# Patient Record
Sex: MALE | Race: WHITE | NOT HISPANIC OR LATINO | Employment: FULL TIME | URBAN - METROPOLITAN AREA
[De-identification: names, ages, dates, MRNs, and addresses within clinical notes are randomized per-mention and may not be internally consistent; named-entity substitution may affect disease eponyms.]

---

## 2017-03-16 ENCOUNTER — TRANSFERRED RECORDS (OUTPATIENT)
Dept: HEALTH INFORMATION MANAGEMENT | Facility: CLINIC | Age: 47
End: 2017-03-16

## 2017-04-13 ENCOUNTER — OFFICE VISIT (OUTPATIENT)
Dept: OTOLARYNGOLOGY | Facility: CLINIC | Age: 47
End: 2017-04-13
Payer: COMMERCIAL

## 2017-04-13 DIAGNOSIS — R22.0 LOCALIZED SWELLING, MASS, AND LUMP OF HEAD: Primary | ICD-10-CM

## 2017-04-13 PROCEDURE — 99204 OFFICE O/P NEW MOD 45 MIN: CPT | Performed by: OTOLARYNGOLOGY

## 2017-04-13 RX ORDER — MULTIVITAMIN,THERAPEUTIC
1 TABLET ORAL DAILY
COMMUNITY

## 2017-04-13 ASSESSMENT — PAIN SCALES - GENERAL: PAINLEVEL: NO PAIN (0)

## 2017-04-13 NOTE — PROGRESS NOTES
[facility]  [address] [place] [id number]    Dear Doctor Feliciano,    Thank you for asking me to see your patient, . Flaco Jimenez, in consultation to evaluate his right cheek lesion.  Today I had the pleasure of seeing him at the Facial Plastic and Reconstructive Surgery Clinic in the Department of Otolaryngology at [facility].    CLINICAL SUMMARY:   Diagnoses:  1) Right facial mass at the anterior edge of the masseter muscle deep to the skin and adherant to the underlying fascia. Possible parotid mass.    Comorbidities: None  Pertinent medications: None  Photographs: [facility] consents signed April 13, 2017.   Other: Enjoys being outdoors and being active.    Care Checklist:   _MRI face with IV contrast.      MEDICAL DECISION MAKING:   This most likely is a parotid mass and recommend an MRI to better understand its exact location and better define its likely diagnosis. However, this could be a bony mass off of the zygoma but I think it has some mobility separate from the zygoma. Because of this, I am favoring obtaining a MRI over a CT. We will obtain that scan and adjust our plan based on the results.        It has been a pleasure to participate in the care of Mr. Jimenez.  Thank you for this kind referral.      Sincerely,    Chay Katz MD    Division of Facial Plastic and Reconstructive Surgery,   Department of Otol[facility]  [facility]   ______________________________________________________________________                HISTORY OF PRESENT ILLNESS and SKIN LESION QUESTIONAAIRE:  As you know, Mr. Jimenez is an 47 year old-year-old male who presents with a skin lesion located on the right  cheek.   He first noticed this lesion 1 year ago.    Previous biopsy of this lesion: none.     His mom noticed about a year ago. No there symptoms.     Bleeding: none.   Provider- Bleeding: none.     Pain:  none.  Provider- Pain: none.    Color change: none.   Provider- Color change: none.     Growth: none.   Provider- Growth: none.     Ulceration: none.   Itching: none.  Purulence: none.   Oozing: none.   Edema: none.   Erythema: none.   History of rupture: none.   Recurrent physical trauma: none.         SKIN QUESTIONNAIRE:   Have you had a lot of sun exposure in the past? Yes    Do you remember blistering sunburns anywhere on your body? Yes  Do you currently smoke?No  Provider- Do you currently smoke?No    Have you smoked in the past?Yes  Have you had previous skin cancers? No  Provider- Have you had previous skin cancers? No    Family history of skin cancer?No      REVIEW OF SYSTEMS: a 12 system review was performed by the patient care staff:    Do you currently have or have you ever had in the past:    No. Complications with sedation or anesthesia.  No. Use blood thinners. Omega 3's                                  No. Any heart problems.   No. Chest pain.   No. A pacemaker.  No. Problems with excessive bleeding or a bleeding disorder.  No. Problems with blood clots or a clotting disorder.   No. Sleep apnea or sleep with a CPAP machine.       No. Excessive scarring.   No. Night sweats.   No. Fevers.   Yes. Double vision.   No. Vision loss.   Yes. Snoring.   No. Difficulty breathing through your nose.   No. Runny nose.   No. Sneezing.   No. Itchy eyes.   No. Itchy throat.   No. Face pain.   No. Face weakness.   No. Face numbness.   No. Difficulty swallowing.   No. Pain with swallowing.,   No. Difficulty hearing or hearing loss.   No. Difficulty urinating.   No. Anxiety.   No. Depression.     FAMILY HISTORY:  No. Family history of excessive bleeding or a bleeding disorder.   No. Family history of blood clots or a clotting disorder.     No. Family history of skin cancer.    No family history on file.    PAST MEDICAL HISTORY: History reviewed. No pertinent past medical history.    PAST SURGICAL HISTORY:   Past  Surgical History:   Procedure Laterality Date     CRANIOTOMY, EVACUATE HEMATOMA SUBDURAL, COMBINED       HERNIA REPAIR         SOCIAL HISTORY:   Social History   Substance Use Topics     Smoking status: Former Smoker     Smokeless tobacco: Not on file     Alcohol use Not on file       ALLERGIES: Clindamycin    MEDICATIONS:   Current Outpatient Prescriptions   Medication Sig Dispense Refill     multivitamin, therapeutic (THERA-VIT) TABS tablet Take 1 tablet by mouth daily       Omega-3 Fatty Acids (OMEGA 3 PO)        Saw Palmetto, Serenoa repens, (SAW PALMETTO EXTRACT PO)        Cholecalciferol (D3 ADULT PO)        BEE POLLEN PO        Yohimbe Bark (YOHIMBE PO)        UNABLE TO FIND MEDICATION NAME: Herbalife Total Control           Patient Care Staff Signature: Emily Pablo RN  ______________________________________________    Provider- Review of systems, FH, PMH, PSH, SH, ALL, and Medications taken by the patient care staff was reviewed by me: Chay Katz      Provider- PHYSICAL EXAMINATION:  CONSTITUTIONAL:  No apparent distress.  Pleasant affect.  Normal ability to communicate.  CRANIOFACIAL:  Normocephalic, atraumatic.    SKIN: overlying the area of concern of the right cheek is entirely normal. Under this area is a 1.5x1.5 cm mass, non-tender, with indistinct borders. Overlying skin is mobile, but the mass has minimal movement within the underlying fascia, but I did feel some movement potentially indicating that it is not a bony growth but I cannot be sure. This is located at the anterior aspect of the masseter muscle when he bites down overlying the inferior edge of the zygoma.     EYES:  Extraocular muscles intact.  EARS:  Normal auricles.   NOSE: No external nasal valve collapse.  Slight septal deviation.  No polyps or purulence.  ORAL CAVITY AND OROPHARYNX: no lesions on inspection.  NECK:  Supple laryngeal landmarks.  LYMPHATIC:  No palpable lymphadenopathy.  CARDIOVASCULAR:  Carotid pulses are  palpable bilaterally.  NEUROLOGIC:  Facial nerve intact.  RESPIRATORY:  Normal respiratory effort.  No stridor.  Voice strong.

## 2017-04-13 NOTE — MR AVS SNAPSHOT
After Visit Summary   4/13/2017    Flaco Jimenez    MRN: [id number]           Patient Information     Date Of Birth          1970        Visit Information        Provider Department      4/13/2017 8:00 AM Chay Katz MD [facility]        Today's Diagnoses     Localized swelling, mass, and lump of head    -  1      Care Instructions    Please contact our clinic if you have questions or if problems arise:    [place] office: [phone]  [facility] office: [phone]    If it is an urgent matter when the clinic is closed, please contact the [facility]  [phone] and ask to have Dr. Chay Katz, or the ENT resident on-call paged. If it is a serious matter requiring immediate attention, please call 911 or go to your nearest emergency department.          Follow-ups after your visit        Your next 10 appointments already scheduled     Apr 18, 2017 11:45 AM CDT   MR NECK FACE W/O & W CONTRAST with MGMR1   [facility] ([facility])    [address] [place] [id number] [phone]           Take your medicines as usual, unless your doctor tells you not to. Bring a list of your current medicines to your exam (including vitamins, minerals and over-the-counter drugs).  You will be given intravenous contrast for this exam. To prepare:   The day before your exam, drink extra fluids at least six 8-ounce glasses (unless your doctor tells you to restrict your fluids).   Have a blood test (creatinine test) within 30 days of your exam. Go to your clinic or Diagnostic Imaging Department for this test.  The MRI machine uses a strong magnet. Please wear clothes without metal (snaps, zippers). A sweatsuit works well, or we may give you a hospital gown.  Please remove any body piercings and hair extensions before you arrive. You will also remove watches, jewelry, hairpins, wallets, dentures, partial  dental plates and hearing aids. You may wear contact lenses, and you may be able to wear your rings. We have a safe place to keep your personal items, but it is safer to leave them at home.   **IMPORTANT** THE INSTRUCTIONS BELOW ARE ONLY FOR THOSE PATIENTS WHO HAVE BEEN TOLD THEY WILL RECEIVE SEDATION OR GENERAL ANESTHESIA DURING THEIR MRI PROCEDURE:  IF YOU WILL RECEIVE SEDATION (take medicine to help you relax during your exam):   You must get the medicine from your doctor before you arrive. Bring the medicine to the exam. Do not take it at home.   Arrive one hour early. Bring someone who can take you home after the test. Your medicine will make you sleepy. After the exam, you may not drive, take a bus or take a taxi by yourself.   No eating 8 hours before your exam. You may have clear liquids up until 4 hours before your exam. (Clear liquids include water, clear tea, black coffee and fruit juice without pulp.)  IF YOU WILL RECEIVE ANESTHESIA (be asleep for your exam):   Arrive 1 1/2 hours early. Bring someone who can take you home after the test. You may not drive, take a bus or take a taxi by yourself.   No eating 8 hours before your exam. You may have clear liquids up until 4 hours before your exam. (Clear liquids include water, clear tea, black coffee and fruit juice without pulp.)  Please call the Imaging Department at your exam site with any questions.            May 11, 2017 10:30 AM CDT   Return Visit with Chay Katz MD   [facility] ([facility])    [address] [place] [address] [phone]              Future tests that were ordered for you today     Open Future Orders        Priority Expected Expires Ordered    MR Neck Face w/o & w Contrast Routine  4/13/2018 4/13/2017            Who to contact     If you have questions or need follow up information about today's clinic visit or your schedule please contact [facility] directly  at [phone].  Normal or non-critical lab and imaging results will be communicated to you by KupiVIPhart, letter or phone within 4 business days after the clinic has received the results. If you do not hear from us within 7 days, please contact the clinic through KupiVIPhart or phone. If you have a critical or abnormal lab result, we will notify you by phone as soon as possible.  Submit refill requests through [employer] or call your pharmacy and they will forward the refill request to us. Please allow 3 business days for your refill to be completed.          Additional Information About Your Visit        Zakazaka Information     Z[employer]aka is an electronic gateway that provides easy, online access to your medical records. With Zakazaka, you can request a clinic appointment, read your test results, renew a prescription or communicate with your care team.     To sign up for Zakazaka visit the website at [email or web address]   You will be asked to enter the access code listed below, as well as some personal information. Please follow the directions to create your username and password.     Your access code is: [email or web address]  Expires: 2017  8:47 AM     Your access code will  in 90 days. If you need help or a new code, please contact your [facility] or call [phone] for assistance.        Care EveryWhere ID     This is your Care EveryWhere ID. This could be used by other organizations to access your [place] medical records  [id number]         Blood Pressure from Last 3 Encounters:   No data found for BP    Weight from Last 3 Encounters:   No data found for Wt               Primary Care Provider    None Specified       No primary provider on file.        Thank you!     Thank you for choosing [facility]  for your care. Our goal is always to provide you with excellent care. Hearing back from our patients is one way we can continue to improve our services. Please  take a few minutes to complete the written survey that you may receive in the mail after your visit with us. Thank you!             Your Updated Medication List - Protect others around you: Learn how to safely use, store and throw away your medicines at www.disposemymeds.org.          This list is accurate as of: 4/13/17  9:03 AM.  Always use your most recent med list.                   Brand Name Dispense Instructions for use    BEE POLLEN PO          D3 ADULT PO          multivitamin, therapeutic Tabs tablet      Take 1 tablet by mouth daily       OMEGA 3 PO          SAW PALMETTO EXTRACT PO          UNABLE TO FIND      MEDICATION NAME: Herbalife Total Control       YOHIMBE PO

## 2017-04-13 NOTE — PATIENT INSTRUCTIONS
Please contact our clinic if you have questions or if problems arise:    [place] office: [phone]  [facility] office: [phone]    If it is an urgent matter when the clinic is closed, please contact the [facility]  [phone] and ask to have Dr. Chay Katz, or the ENT resident on-call paged. If it is a serious matter requiring immediate attention, please call 911 or go to your nearest emergency department.

## 2017-04-13 NOTE — NURSING NOTE
Flaco Jimenez's goals for this visit include:   Chief Complaint   Patient presents with     Lesion     right cheek lesion       He requests these members of his care team be copied on today's visit information: No primary care provider on file.      PCP: No primary care provider on file.    Referring Provider:  Ye Wiggins MD  [facility]  [address] [place] [address]    Chief Complaint   Patient presents with     Lesion     right cheek lesion       Initial There were no vitals taken for this visit. There is no height or weight on file to calculate BMI.  Medication Reconciliation: complete    Do you need any medication refills at today's visit? No    Emily Pablo RN

## 2017-05-08 ENCOUNTER — RADIANT APPOINTMENT (OUTPATIENT)
Dept: MRI IMAGING | Facility: CLINIC | Age: 47
End: 2017-05-08
Attending: OTOLARYNGOLOGY
Payer: COMMERCIAL

## 2017-05-08 DIAGNOSIS — R22.0 LOCALIZED SWELLING, MASS, AND LUMP OF HEAD: ICD-10-CM

## 2017-05-08 PROCEDURE — 70543 MRI ORBT/FAC/NCK W/O &W/DYE: CPT | Performed by: RADIOLOGY

## 2017-05-08 PROCEDURE — A9585 GADOBUTROL INJECTION: HCPCS | Mod: JW | Performed by: RADIOLOGY

## 2017-05-08 RX ORDER — GADOBUTROL 604.72 MG/ML
10 INJECTION INTRAVENOUS ONCE
Status: COMPLETED | OUTPATIENT
Start: 2017-05-08 | End: 2017-05-08

## 2017-05-08 RX ADMIN — GADOBUTROL 9 ML: 604.72 INJECTION INTRAVENOUS at 08:47

## 2017-05-11 ENCOUNTER — OFFICE VISIT (OUTPATIENT)
Dept: OTOLARYNGOLOGY | Facility: CLINIC | Age: 47
End: 2017-05-11
Payer: COMMERCIAL

## 2017-05-11 DIAGNOSIS — R22.0 LOCALIZED SWELLING, MASS, AND LUMP OF HEAD: Primary | ICD-10-CM

## 2017-05-11 PROCEDURE — 99213 OFFICE O/P EST LOW 20 MIN: CPT | Performed by: OTOLARYNGOLOGY

## 2017-05-11 ASSESSMENT — PAIN SCALES - GENERAL: PAINLEVEL: NO PAIN (0)

## 2017-05-11 NOTE — PROGRESS NOTES
Today I had the pleasure of seeing Mr. Flaco Jimenez at the Facial Plastic and Reconstructive Surgery Clinic in the Department of Otolaryngology at the [facility].  He follows up after undergoing an MRI.  He reports no change in his condition.  He thinks the mass may have grown a small amount since I saw him last.  Most importantly, he reports no neurologic changes.  No bodily weakness or numbness.  No change in his function.  No sensation changes.  He feels in otherwise good health.      PHYSICAL EXAMINATION:  He is in no apparent distress.  Pleasant affect.  Normal ability to communicate.  Normal respiratory effort.  No stridor.  Voice strong.  He continues to have an approximately 1.5 x 1.5 cm mass of his right cheek.  This is clearly separate from his overlying skin because the skin is mobile.  On palpation it is stuck down to the deep fascia.  Although I can get to move bleed and it has some movement that separate from the underlying bone and deep musculature.  It is nontender.  He has a normal cranial nerve exam and his facial nerve function is intact and symmetric bilaterally.      Mr. Jimenez and I reviewed the MRI images together showing the right parotid mass.  We also reviewed the official reading.        ASSESSMENT AND RECOMMENDATIONS:  We discussed how this mass of his right parotid gland was read as a possible malignancy.  I recommend seeing one of my colleagues in the Head and Neck Oncology at the [facility] for further evaluation and treatment.  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  I recommend he discuss this with his primary care provider.  He reported that he did not have a primary care provider so our office staff helped make those arrangements for him to establish care with primary care and discuss this finding.  I think it is certainly safe to handle  this finding as an outpatient because it was read as a chronic infarct and he reports absolutely no neurologic symptoms at this time.      It has been a pleasure to participate in the care of Mr. Jimenez.  I encouraged him to contact us if he has questions or if I can be of service.         YANNA JESUS MD             D: 2017 10:54   T: 2017 17:16   MT:       Name:     RICHY JIMENEZ   MRN:      [id number]        Account:      [id number]   :      1970           Visit Date:   2017      Document: [id number]

## 2017-05-11 NOTE — PROGRESS NOTES
CLINICAL SUMMARY:   Diagnoses:  1) Right parotid mass at the anterior edge of the masseter muscle deep to the skin and adherant to the underlying fascia.   -5/8/17: MRI Neck and Face: 1. Enhancing mass centered within accessory right parotid tissue overlying the right masseter muscle, suspicious for primary salivary gland malignancy. No visualized lymphadenopathy. 2. 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       Comorbidities: None  Pertinent medications: None  Photographs: [facility] consents signed April 13, 2017.   Other: Enjoys being outdoors and being active.    Care Checklist:   _Consultation with head and neck oncology at [facility] for possible parotid malignancy.  _Discuss with PCP the cerebral peduncle infarct.          This office note has been dictated.  Chay Katz

## 2017-05-11 NOTE — PATIENT INSTRUCTIONS
Please contact our clinic if you have questions or if problems arise:    [place] office: [phone]  [facility] office: [phone]    If it is an urgent matter when the clinic is closed, please contact the [facility]  [phone] and ask to have Dr. Chay Katz, or the ENT resident on-call paged. If it is a serious matter requiring immediate attention, please call 911 or go to your nearest emergency department.

## 2017-05-11 NOTE — MR AVS SNAPSHOT
After Visit Summary   5/11/2017    Flaco Jimenez    MRN: [id number]           Patient Information     Date Of Birth          1970        Visit Information        Provider Department      5/11/2017 10:30 AM Chay Katz MD [facility]        Today's Diagnoses     Localized swelling, mass, and lump of head    -  1       Follow-ups after your visit        Your next 10 appointments already scheduled     May 18, 2017  9:20 AM CDT   New Visit with JIN Singh CNP   [facility] ([facility])    [address] [place] [address] [phone]            May 19, 2017  1:20 PM CDT   (Arrive by 1:05 PM)   NEW TUMOR VISIT with Amie Warren MD   [facility] ([facility])    [address] [address] [phone]              Who to contact     If you have questions or need follow up information about today's clinic visit or your schedule please contact [facility] directly at [phone].  Normal or non-critical lab and imaging results will be communicated to you by MyChart, letter or phone within 4 business days after the clinic has received the results. If you do not hear from us within 7 days, please contact the clinic through MyChart or phone. If you have a critical or abnormal lab result, we will notify you by phone as soon as possible.  Submit refill requests through [employer] or call your pharmacy and they will forward the refill request to us. Please allow 3 business days for your refill to be completed.          Additional Information About Your Visit        MyChart Information     [employer] is an electronic gateway that provides easy, online access to your medical records. With [employer], you can request a clinic appointment, read your test results, renew a prescription or communicate with your care team.     To sign up for  [employer] visit the website at [email or web address]   You will be asked to enter the access code listed below, as well as some personal information. Please follow the directions to create your username and password.     Your access code is: [email or web address]  Expires: 2017  8:47 AM     Your access code will  in 90 days. If you need help or a new code, please contact your [facility] or call [phone] for assistance.        Care EveryWhere ID     This is your Care EveryWhere ID. This could be used by other organizations to access your [place] medical records  [id number]         Blood Pressure from Last 3 Encounters:   No data found for BP    Weight from Last 3 Encounters:   No data found for Wt              Today, you had the following     No orders found for display       Primary Care Provider    None Specified       No primary provider on file.        Thank you!     Thank you for choosing [facility]  for your care. Our goal is always to provide you with excellent care. Hearing back from our patients is one way we can continue to improve our services. Please take a few minutes to complete the written survey that you may receive in the mail after your visit with us. Thank you!             Your Updated Medication List - Protect others around you: Learn how to safely use, store and throw away your medicines at www.disposemymeds.org.          This list is accurate as of: 17 10:50 AM.  Always use your most recent med list.                   Brand Name Dispense Instructions for use    AMINO ACIDS PO      Take by mouth three times a week       BEE POLLEN PO          D3 ADULT PO          multivitamin, therapeutic Tabs tablet      Take 1 tablet by mouth daily       OMEGA 3 PO          SAW PALMETTO EXTRACT PO          UNABLE TO FIND      MEDICATION NAME: Herbalife Total Control       YOHIMBE PO      Reported on 2017

## 2017-05-12 ENCOUNTER — PRE VISIT (OUTPATIENT)
Dept: OTOLARYNGOLOGY | Facility: CLINIC | Age: 47
End: 2017-05-12

## 2017-05-12 NOTE — TELEPHONE ENCOUNTER
1.  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  Referring provider: Chay Katz  3.  Call to patient (Yes / No - short description): no, referred  4.  Previous care at / records requested from: [facility] -- records and imaging in epic/pacs  5.  Other: [facility] -- one clinic note 3/16/17 -- records in [facility].

## 2017-05-19 ENCOUNTER — OFFICE VISIT (OUTPATIENT)
Dept: OTOLARYNGOLOGY | Facility: CLINIC | Age: 47
End: 2017-05-19

## 2017-05-19 VITALS
OXYGEN SATURATION: 95 % | HEART RATE: 52 BPM | SYSTOLIC BLOOD PRESSURE: 132 MMHG | WEIGHT: 195 LBS | DIASTOLIC BLOOD PRESSURE: 86 MMHG

## 2017-05-19 DIAGNOSIS — R22.0 CHEEK MASS: Primary | ICD-10-CM

## 2017-05-19 DIAGNOSIS — D11.0 PLEOMORPHIC ADENOMA OF PAROTID GLAND: ICD-10-CM

## 2017-05-19 RX ORDER — ALBUTEROL SULFATE 90 UG/1
AEROSOL, METERED RESPIRATORY (INHALATION)
COMMUNITY
Start: 2016-10-30

## 2017-05-19 ASSESSMENT — PAIN SCALES - GENERAL: PAINLEVEL: NO PAIN (0)

## 2017-05-19 NOTE — PATIENT INSTRUCTIONS
FNA done today  Consistent with a pleomorphic adenoma  Call me if You are interested in surgery  [phone]  Trish Brink RN

## 2017-05-19 NOTE — NURSING NOTE
Chief Complaint   Patient presents with     Consult     New Tumor Dx: Salivary gland malignancy Dr. Katz from  ENT referring to Dr. Rodrigez or 1st available MRI 5/8/2017     Pt states no pain today.    TALI Benitez LPN

## 2017-05-19 NOTE — MR AVS SNAPSHOT
After Visit Summary   5/19/2017    Boris Jimenez    MRN: [id number]           Patient Information     Date Of Birth          1970        Visit Information        Provider Department      5/19/2017 1:20 PM Amie Warren MD [facility]        Today's Diagnoses     Cheek mass    -  1    Pleomorphic adenoma of parotid gland          Care Instructions    FNA done today  Consistent with a pleomorphic adenoma  Call me if You are interested in surgery  [phone]  Trish Brink RN        Follow-ups after your visit        Who to contact     Please call your clinic at [phone] to:    Ask questions about your health    Make or cancel appointments    Discuss your medicines    Learn about your test results    Speak to your doctor   If you have compliments or concerns about an experience at your clinic, or if you wish to file a complaint, please contact [facility] Patient Relations at [phone] or email us at [email or web address]         Additional Information About Your Visit        MyChart Information     [employer] gives you secure access to your electronic health record. If you see a primary care provider, you can also send messages to your care team and make appointments. If you have questions, please call your primary care clinic.  If you do not have a primary care provider, please call [phone] and they will assist you.      [employer] is an electronic gateway that provides easy, online access to your medical records. With [employer], you can request a clinic appointment, read your test results, renew a prescription or communicate with your care team.     To access your existing account, please contact your [facility] Clinic or call [phone] for assistance.        Care EveryWhere ID     This is your Care EveryWhere ID. This could be used by other organizations to access your [facility] medical records  [id number]         Your Vitals Were     Pulse Pulse Oximetry                52 95%           Blood Pressure from Last 3 Encounters:   05/19/17 132/86    Weight from Last 3 Encounters:   05/19/17 88.5 kg (195 lb)              We Performed the Following     Fine needle aspiration        Primary Care Provider    [facility]       No address on file        Thank you!     Thank you for choosing [facility]  for your care. Our goal is always to provide you with excellent care. Hearing back from our patients is one way we can continue to improve our services. Please take a few minutes to complete the written survey that you may receive in the mail after your visit with us. Thank you!             Your Updated Medication List - Protect others around you: Learn how to safely use, store and throw away your medicines at www.disposemymeds.org.          This list is accurate as of: 5/19/17 11:59 PM.  Always use your most recent med list.                   Brand Name Dispense Instructions for use    AMINO ACIDS PO      Take by mouth three times a week       BEE POLLEN PO          D3 ADULT PO          multivitamin, therapeutic Tabs tablet      Take 1 tablet by mouth daily       OMEGA 3 PO          SAW PALMETTO EXTRACT PO          UNABLE TO FIND      MEDICATION NAME: Herbalife Total Control       VENTOLIN  (90 BASE) MCG/ACT Inhaler   Generic drug:  albuterol          YOHIMBE PO      Reported on 5/11/2017
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
## 2017-05-20 PROBLEM — D11.0 PLEOMORPHIC ADENOMA OF PAROTID GLAND: Status: ACTIVE | Noted: 2017-05-20
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
## 2017-05-24 LAB — COPATH REPORT: NORMAL

## 2017-05-31 ENCOUNTER — CARE COORDINATION (OUTPATIENT)
Dept: OTOLARYNGOLOGY | Facility: CLINIC | Age: 47
End: 2017-05-31

## 2017-05-31 NOTE — PROGRESS NOTES
Dr. Warren reviewed Flaco's pathology  Flaco was called and a message left on his voicemail with the results  He is to call me when and if he is interested in surgery

## 2017-06-01 DIAGNOSIS — K11.8 PAROTID MASS: Primary | ICD-10-CM

## 2017-06-05 ENCOUNTER — HOSPITAL ENCOUNTER (OUTPATIENT)
Facility: CLINIC | Age: 47
End: 2017-06-05
Attending: OTOLARYNGOLOGY | Admitting: OTOLARYNGOLOGY
Payer: COMMERCIAL

## 2017-06-07 RX ORDER — CEFAZOLIN SODIUM 2 G/100ML
2 INJECTION, SOLUTION INTRAVENOUS
Status: CANCELLED | OUTPATIENT
Start: 2017-06-07

## 2017-06-08 ENCOUNTER — TRANSFERRED RECORDS (OUTPATIENT)
Dept: HEALTH INFORMATION MANAGEMENT | Facility: CLINIC | Age: 47
End: 2017-06-08

## 2017-06-14 ENCOUNTER — ANESTHESIA EVENT (OUTPATIENT)
Dept: SURGERY | Facility: AMBULATORY SURGERY CENTER | Age: 47
End: 2017-06-14

## 2017-06-15 ENCOUNTER — HOSPITAL ENCOUNTER (OUTPATIENT)
Facility: AMBULATORY SURGERY CENTER | Age: 47
End: 2017-06-15
Attending: OTOLARYNGOLOGY

## 2017-06-15 ENCOUNTER — ANESTHESIA (OUTPATIENT)
Dept: SURGERY | Facility: AMBULATORY SURGERY CENTER | Age: 47
End: 2017-06-15

## 2017-06-15 ENCOUNTER — SURGERY (OUTPATIENT)
Age: 47
End: 2017-06-15

## 2017-06-15 VITALS
TEMPERATURE: 96.8 F | DIASTOLIC BLOOD PRESSURE: 72 MMHG | OXYGEN SATURATION: 94 % | HEIGHT: 69 IN | SYSTOLIC BLOOD PRESSURE: 129 MMHG | RESPIRATION RATE: 14 BRPM | BODY MASS INDEX: 28.88 KG/M2 | HEART RATE: 52 BPM | WEIGHT: 195 LBS

## 2017-06-15 DIAGNOSIS — K11.8 PAROTID MASS: Primary | ICD-10-CM

## 2017-06-15 RX ORDER — FENTANYL CITRATE 50 UG/ML
25-50 INJECTION, SOLUTION INTRAMUSCULAR; INTRAVENOUS
Status: DISCONTINUED | OUTPATIENT
Start: 2017-06-15 | End: 2017-06-15 | Stop reason: HOSPADM

## 2017-06-15 RX ORDER — OXYCODONE HYDROCHLORIDE 5 MG/1
5-10 TABLET ORAL
Qty: 30 TABLET | Refills: 0 | Status: SHIPPED | OUTPATIENT
Start: 2017-06-15 | End: 2023-09-13

## 2017-06-15 RX ORDER — SODIUM CHLORIDE, SODIUM LACTATE, POTASSIUM CHLORIDE, CALCIUM CHLORIDE 600; 310; 30; 20 MG/100ML; MG/100ML; MG/100ML; MG/100ML
INJECTION, SOLUTION INTRAVENOUS CONTINUOUS
Status: DISCONTINUED | OUTPATIENT
Start: 2017-06-15 | End: 2017-06-15 | Stop reason: HOSPADM

## 2017-06-15 RX ORDER — GINSENG 100 MG
CAPSULE ORAL PRN
Status: DISCONTINUED | OUTPATIENT
Start: 2017-06-15 | End: 2017-06-15 | Stop reason: HOSPADM

## 2017-06-15 RX ORDER — LIDOCAINE HYDROCHLORIDE 20 MG/ML
INJECTION, SOLUTION INFILTRATION; PERINEURAL PRN
Status: DISCONTINUED | OUTPATIENT
Start: 2017-06-15 | End: 2017-06-15

## 2017-06-15 RX ORDER — FENTANYL CITRATE 50 UG/ML
INJECTION, SOLUTION INTRAMUSCULAR; INTRAVENOUS PRN
Status: DISCONTINUED | OUTPATIENT
Start: 2017-06-15 | End: 2017-06-15

## 2017-06-15 RX ORDER — ONDANSETRON 4 MG/1
4 TABLET, ORALLY DISINTEGRATING ORAL EVERY 30 MIN PRN
Status: DISCONTINUED | OUTPATIENT
Start: 2017-06-15 | End: 2017-06-16 | Stop reason: HOSPADM

## 2017-06-15 RX ORDER — NALOXONE HYDROCHLORIDE 0.4 MG/ML
.1-.4 INJECTION, SOLUTION INTRAMUSCULAR; INTRAVENOUS; SUBCUTANEOUS
Status: DISCONTINUED | OUTPATIENT
Start: 2017-06-15 | End: 2017-06-16 | Stop reason: HOSPADM

## 2017-06-15 RX ORDER — ONDANSETRON 4 MG/1
4 TABLET, ORALLY DISINTEGRATING ORAL
Status: DISCONTINUED | OUTPATIENT
Start: 2017-06-15 | End: 2017-06-16 | Stop reason: HOSPADM

## 2017-06-15 RX ORDER — SODIUM CHLORIDE, SODIUM LACTATE, POTASSIUM CHLORIDE, CALCIUM CHLORIDE 600; 310; 30; 20 MG/100ML; MG/100ML; MG/100ML; MG/100ML
INJECTION, SOLUTION INTRAVENOUS CONTINUOUS
Status: DISCONTINUED | OUTPATIENT
Start: 2017-06-15 | End: 2017-06-16 | Stop reason: HOSPADM

## 2017-06-15 RX ORDER — GABAPENTIN 300 MG/1
300 CAPSULE ORAL ONCE
Status: COMPLETED | OUTPATIENT
Start: 2017-06-15 | End: 2017-06-15

## 2017-06-15 RX ORDER — ONDANSETRON 2 MG/ML
4 INJECTION INTRAMUSCULAR; INTRAVENOUS EVERY 30 MIN PRN
Status: DISCONTINUED | OUTPATIENT
Start: 2017-06-15 | End: 2017-06-16 | Stop reason: HOSPADM

## 2017-06-15 RX ORDER — FENTANYL CITRATE 50 UG/ML
25-50 INJECTION, SOLUTION INTRAMUSCULAR; INTRAVENOUS EVERY 5 MIN PRN
Status: DISCONTINUED | OUTPATIENT
Start: 2017-06-15 | End: 2017-06-15 | Stop reason: HOSPADM

## 2017-06-15 RX ORDER — PROPOFOL 10 MG/ML
INJECTION, EMULSION INTRAVENOUS PRN
Status: DISCONTINUED | OUTPATIENT
Start: 2017-06-15 | End: 2017-06-15

## 2017-06-15 RX ORDER — LIDOCAINE 40 MG/G
CREAM TOPICAL
Status: DISCONTINUED | OUTPATIENT
Start: 2017-06-15 | End: 2017-06-15 | Stop reason: HOSPADM

## 2017-06-15 RX ORDER — PROPOFOL 10 MG/ML
INJECTION, EMULSION INTRAVENOUS CONTINUOUS PRN
Status: DISCONTINUED | OUTPATIENT
Start: 2017-06-15 | End: 2017-06-15

## 2017-06-15 RX ORDER — EPHEDRINE SULFATE 50 MG/ML
INJECTION, SOLUTION INTRAMUSCULAR; INTRAVENOUS; SUBCUTANEOUS PRN
Status: DISCONTINUED | OUTPATIENT
Start: 2017-06-15 | End: 2017-06-15

## 2017-06-15 RX ORDER — ACETAMINOPHEN 325 MG/1
975 TABLET ORAL ONCE
Status: COMPLETED | OUTPATIENT
Start: 2017-06-15 | End: 2017-06-15

## 2017-06-15 RX ORDER — ACETAMINOPHEN 325 MG/1
650 TABLET ORAL
Status: DISCONTINUED | OUTPATIENT
Start: 2017-06-15 | End: 2017-06-16 | Stop reason: HOSPADM

## 2017-06-15 RX ORDER — MEPERIDINE HYDROCHLORIDE 25 MG/ML
12.5 INJECTION INTRAMUSCULAR; INTRAVENOUS; SUBCUTANEOUS
Status: DISCONTINUED | OUTPATIENT
Start: 2017-06-15 | End: 2017-06-16 | Stop reason: HOSPADM

## 2017-06-15 RX ORDER — OXYCODONE HYDROCHLORIDE 5 MG/1
5-10 TABLET ORAL
Status: DISCONTINUED | OUTPATIENT
Start: 2017-06-15 | End: 2017-06-16 | Stop reason: HOSPADM

## 2017-06-15 RX ORDER — AMOXICILLIN 250 MG
1-2 CAPSULE ORAL 2 TIMES DAILY
Qty: 30 TABLET | Refills: 0 | Status: SHIPPED | OUTPATIENT
Start: 2017-06-15

## 2017-06-15 RX ORDER — ONDANSETRON 2 MG/ML
INJECTION INTRAMUSCULAR; INTRAVENOUS PRN
Status: DISCONTINUED | OUTPATIENT
Start: 2017-06-15 | End: 2017-06-15

## 2017-06-15 RX ADMIN — LIDOCAINE HYDROCHLORIDE 100 MG: 20 INJECTION, SOLUTION INFILTRATION; PERINEURAL at 07:23

## 2017-06-15 RX ADMIN — SODIUM CHLORIDE, SODIUM LACTATE, POTASSIUM CHLORIDE, CALCIUM CHLORIDE: 600; 310; 30; 20 INJECTION, SOLUTION INTRAVENOUS at 07:16

## 2017-06-15 RX ADMIN — ACETAMINOPHEN 975 MG: 325 TABLET ORAL at 06:31

## 2017-06-15 RX ADMIN — SODIUM CHLORIDE, SODIUM LACTATE, POTASSIUM CHLORIDE, CALCIUM CHLORIDE: 600; 310; 30; 20 INJECTION, SOLUTION INTRAVENOUS at 06:34

## 2017-06-15 RX ADMIN — FENTANYL CITRATE 50 MCG: 50 INJECTION, SOLUTION INTRAMUSCULAR; INTRAVENOUS at 07:23

## 2017-06-15 RX ADMIN — Medication 0.5 MG: at 10:05

## 2017-06-15 RX ADMIN — FENTANYL CITRATE 50 MCG: 50 INJECTION, SOLUTION INTRAMUSCULAR; INTRAVENOUS at 07:53

## 2017-06-15 RX ADMIN — PROPOFOL 230 MG: 10 INJECTION, EMULSION INTRAVENOUS at 07:23

## 2017-06-15 RX ADMIN — PROPOFOL 70 MG: 10 INJECTION, EMULSION INTRAVENOUS at 07:57

## 2017-06-15 RX ADMIN — Medication 0.5 MG: at 08:01

## 2017-06-15 RX ADMIN — Medication 1 TUBE: at 10:57

## 2017-06-15 RX ADMIN — PROPOFOL: 10 INJECTION, EMULSION INTRAVENOUS at 08:56

## 2017-06-15 RX ADMIN — GABAPENTIN 300 MG: 300 CAPSULE ORAL at 06:31

## 2017-06-15 RX ADMIN — EPHEDRINE SULFATE 5 MG: 50 INJECTION, SOLUTION INTRAMUSCULAR; INTRAVENOUS; SUBCUTANEOUS at 07:47

## 2017-06-15 RX ADMIN — SODIUM CHLORIDE, SODIUM LACTATE, POTASSIUM CHLORIDE, CALCIUM CHLORIDE: 600; 310; 30; 20 INJECTION, SOLUTION INTRAVENOUS at 10:45

## 2017-06-15 RX ADMIN — PROPOFOL 200 MCG/KG/MIN: 10 INJECTION, EMULSION INTRAVENOUS at 07:25

## 2017-06-15 RX ADMIN — ONDANSETRON 4 MG: 2 INJECTION INTRAMUSCULAR; INTRAVENOUS at 10:43

## 2017-06-15 NOTE — OR NURSING
Dr Warren's resident came to PACU to assess patient's cranial nerves. She is comfortable with patient's response, inability to smile on the right, stated that his nerves will be irritated over the next couple weeks as the surgery took place right next to the nerves.   Xochilt Joshua RN BSN CPN  6/15/2017 12:26 PM

## 2017-06-15 NOTE — BRIEF OP NOTE
[facility]    Brief Operative Note    Pre-operative diagnosis: Parotid Mass  Post-operative diagnosis * No post-op diagnosis entered *  Procedure: Procedure(s):  Right Parotidectomy with continuous facial nerve monitoring - Wound Class: I-Clean  Surgeon: Surgeon(s) and Role:     * Amie Warren MD - Primary  Anesthesia: General   Estimated blood loss: * No values recorded between 6/15/2017  7:57 AM and 6/15/2017 11:35 AM *  Drains: Chet-Engle  Specimens:   ID Type Source Tests Collected by Time Destination   A : Right Partial Parotidectomy Tissue Parotid Gland SURGICAL PATHOLOGY EXAM Amie Warren MD 6/15/2017 10:41 AM      Findings:   None.  Complications: None.  Implants: None.

## 2017-06-15 NOTE — OR NURSING
RN paged Dr Warren regarding Boris's cranial nerve assesments in PACU. Report from CRNA to Phase 1 was that patient's smile was not intact at the end of the case and that Dr Warren would be by later when the patient was not as sedated to reassess. Dr Warren stated to RN that patient did not need cranial nerve assessments. She stated she will send a resident over however as the patients smile is fully intact on the left side while the right side of the patients face does not move. Patients left shoulder raises higher in a shrug than the right shoulder. Patient continues to be monitored in Phase 1 recovery.  Xochilt Joshua RN BSN CPN  6/15/2017 12:15 PM

## 2017-06-15 NOTE — DISCHARGE INSTRUCTIONS
[facility]  Home Care Following Anesthesia  For 24 hours after surgery:  1. Get plenty of rest.  A responsible adult must stay with you for at least 24 hours after you leave the surgery center.  2. Do not drive or use heavy equipment.  If you have weakness or tingling, don't drive or use heavy equipment until this feeling goes away.   3. Do not drink alcohol.   4. Avoid strenuous or risky activities.  Ask for help when climbing stairs.  5. You may feel lightheaded.  IF so, sit for a few minutes before standing.  Have someone help you get up.   6. If you have nausea (feel sick to your stomach): Drink only clear liquids such as apple juice, ginger ale, broth or 7-Up.  Rest may also help.  Be sure to drink enough fluids.  Move to a regular diet as you feel able.   7. You may have a slight fever.  Call the doctor if your fever is over 100 F (37.7 C) (taken under the tongue) or lasts longer than 24 hours.  8. You may have a dry mouth, a sore throat, muscle aches or trouble sleeping. These should go away after 24 hours.  9. Do not make important or legal decisions.     tips for taking pain medications  To get the best pain relief possible, remember these points:    Take pain medications as directed, before pain becomes severe.    Pain medication can upset your stomach: taking it with food may help.    Constipation is a common side effect of pain medication. Drink plenty of  fluids.    Eat foods high in fiber. Take a stool softener if recommended by your doctor or pharmacist.    Do not drink alcohol, drive or operate machinery while taking pain medications.    Ask about other ways to control pain, such as with heat, ice or relaxation.    Call a doctor for any of the followin. Signs of infection (fever, growing tenderness at the surgery site, a large amount of drainage or bleeding, severe pain, foul-smelling drainage, redness, swelling).  2. It has been over 8 to 10 hours since  surgery and you are still not able to urinate (pass water).  3. Headache for over 24 hours.  4. Numbness, tingling or weakness the day after surgery (if you had spinal anesthesia).  Your doctor is: Dr. Warren   [phone]  ***               Or dial [phone] and ask for the resident on call for:  ENT Otolaryngology  For emergency care, call the:  [facility]:  [phone] (TTY for hearing impaired: [phone])

## 2017-06-15 NOTE — IP AVS SNAPSHOT
MRN:[id number]                      After Visit Summary   6/15/2017    Boris Jimenez    MRN: [id number]           Thank you!     Thank you for choosing [facility] for your care. Our goal is always to provide you with excellent care. Hearing back from our patients is one way we can continue to improve our services. Please take a few minutes to complete the written survey that you may receive in the mail after you visit with us. Thank you!        Patient Information     Date Of Birth          1970        About your hospital stay     You were admitted on:  Brenda 15, 2017 [facility]    You were discharged on:  Brenda 15, 2017       Who to Call     For medical emergencies, please call 911.  For non-urgent questions about your medical care, please call your primary care provider or clinic, None  For questions related to your surgery, please call your surgery clinic        Attending Provider     Provider Amie Herr MD Otolaryngology       Primary Care Provider    [facility]      After Care Instructions     Check with Provider when to start anticoagulants           Diet Instructions       Resume pre procedure diet            Discharge Instructions       Patient to follow up with surgeon in 7 days            Discharge Instructions - Lifting restrictions       Lifting Restrictions 10 pounds until seen at Post-op follow up appointment            No Alcohol       For 24 hours following procedure or while taking narcotic pain medication            No Aspirin, Ibuprofen or Naproxen products       for 7 - 10 days following surgery            No driving or operating machinery       until the day after procedure or while taking narcotic pain medication            Notify Physician        Please notify your doctor if you experience wound breakdown, sustained bleeding from the wound site, or increasing redness,  "swelling, and/or purulent malorodorous discharge from the wound site which may indicate infection. If you feel it is acute, please return to the emergency department. If you have questions or concerns during the day please call ENT clinic at [phone]. If at night you can call [facility] at [phone] and ask for the \"ENT resident on call\".            Wound care       Keep dressing clean and dry and intact. Do not soak or scrub the incision.                  Your next 10 appointments already scheduled     Jun 23, 2017  2:40 PM CDT   (Arrive by 2:25 PM)   Return Visit with Amie Warren MD   [facility] ([facility])    [address] [address] [phone]              Further instructions from your care team       [facility]  Home Care Following Anesthesia  For 24 hours after surgery:  1. Get plenty of rest.  A responsible adult must stay with you for at least 24 hours after you leave the surgery center.  2. Do not drive or use heavy equipment.  If you have weakness or tingling, don't drive or use heavy equipment until this feeling goes away.   3. Do not drink alcohol.   4. Avoid strenuous or risky activities.  Ask for help when climbing stairs.  5. You may feel lightheaded.  IF so, sit for a few minutes before standing.  Have someone help you get up.   6. If you have nausea (feel sick to your stomach): Drink only clear liquids such as apple juice, ginger ale, broth or 7-Up.  Rest may also help.  Be sure to drink enough fluids.  Move to a regular diet as you feel able.   7. You may have a slight fever.  Call the doctor if your fever is over 100 F (37.7 C) (taken under the tongue) or lasts longer than 24 hours.  8. You may have a dry mouth, a sore throat, muscle aches or trouble sleeping. These should go away after 24 hours.  9. Do not make important or legal decisions.     tips for " "taking pain medications  To get the best pain relief possible, remember these points:    Take pain medications as directed, before pain becomes severe.    Pain medication can upset your stomach: taking it with food may help.    Constipation is a common side effect of pain medication. Drink plenty of  fluids.    Eat foods high in fiber. Take a stool softener if recommended by your doctor or pharmacist.    Do not drink alcohol, drive or operate machinery while taking pain medications.    Ask about other ways to control pain, such as with heat, ice or relaxation.    Call a doctor for any of the followin. Signs of infection (fever, growing tenderness at the surgery site, a large amount of drainage or bleeding, severe pain, foul-smelling drainage, redness, swelling).  2. It has been over 8 to 10 hours since surgery and you are still not able to urinate (pass water).  3. Headache for over 24 hours.  4. Numbness, tingling or weakness the day after surgery (if you had spinal anesthesia).  Your doctor is: Dr. Warren   [phone]  ***               Or dial [phone] and ask for the resident on call for:  ENT Otolaryngology  For emergency care, call the:  [facility]:  [phone] (TTY for hearing impaired: [phone])                        Pending Results     No orders found from 2017 to 2017.            Admission Information     Date & Time Provider Department Dept. Phone    6/15/2017 Amie Warren MD [facility] [phone]      Your Vitals Were     Blood Pressure Pulse Temperature Respirations Height Weight    109/77 52 97.9  F (36.6  C) (Oral) 16 1.746 m (5' 8.75\") 88.5 kg (195 lb)    Pulse Oximetry BMI (Body Mass Index)                96% 29.01 kg/m2          [employer] Information     [employer] gives you secure access to your electronic health record. If you see a primary care provider, you can also send messages to your care team and make " appointments. If you have questions, please call your primary care clinic.  If you do not have a primary care provider, please call [phone] and they will assist you.      [employer] is an electronic gateway that provides easy, online access to your medical records. With [employer], you can request a clinic appointment, read your test results, renew a prescription or communicate with your care team.     To access your existing account, please contact your [facility] or call [phone] for assistance.        Care EveryWhere ID     This is your Care EveryWhere ID. This could be used by other organizations to access your [facility] medical records  [id number]           Review of your medicines      START taking        Dose / Directions    oxyCODONE 5 MG IR tablet   Commonly known as:  ROXICODONE   Used for:  Parotid mass        Dose:  5-10 mg   Take 1-2 tablets (5-10 mg) by mouth every 3 hours as needed for pain or other (Moderate to Severe)   Quantity:  30 tablet   Refills:  0       senna-docusate 8.6-50 MG per tablet   Commonly known as:  SENOKOT-S;PERICOLACE   Used for:  Parotid mass        Dose:  1-2 tablet   Take 1-2 tablets by mouth 2 times daily Take while on oral narcotics to prevent or treat constipation.   Quantity:  30 tablet   Refills:  0         CONTINUE these medicines which have NOT CHANGED        Dose / Directions    AMINO ACIDS PO        Take by mouth three times a week   Refills:  0       BEE POLLEN PO        Refills:  0       D3 ADULT PO        Refills:  0       multivitamin, therapeutic Tabs tablet        Dose:  1 tablet   Take 1 tablet by mouth daily   Refills:  0       SAW PALMETTO EXTRACT PO        Refills:  0       UNABLE TO FIND        MEDICATION NAME: Herbalife Total Control   Refills:  0       VENTOLIN  (90 BASE) MCG/ACT Inhaler   Generic drug:  albuterol        Refills:  0       YOHIMBE PO        Reported on 5/11/2017   Refills:  0         STOP taking      OMEGA 3 PO                Where to get your medicines      These medications were sent to [place], [place] - [address]  [address], [facility] [address]    Hours:  TRANSPLANT PHONE NUMBER [phone] Phone:  [phone]     senna-docusate 8.6-50 MG per tablet         Some of these will need a paper prescription and others can be bought over the counter. Ask your nurse if you have questions.     Bring a paper prescription for each of these medications     oxyCODONE 5 MG IR tablet                Protect others around you: Learn how to safely use, store and throw away your medicines at www.disposemymeds.org.             Medication List: This is a list of all your medications and when to take them. Check marks below indicate your daily home schedule. Keep this list as a reference.      Medications           Morning Afternoon Evening Bedtime As Needed    AMINO ACIDS PO   Take by mouth three times a week                                BEE POLLEN PO                                D3 ADULT PO                                multivitamin, therapeutic Tabs tablet   Take 1 tablet by mouth daily                                oxyCODONE 5 MG IR tablet   Commonly known as:  ROXICODONE   Take 1-2 tablets (5-10 mg) by mouth every 3 hours as needed for pain or other (Moderate to Severe)                                SAW PALMETTO EXTRACT PO                                senna-docusate 8.6-50 MG per tablet   Commonly known as:  SENOKOT-S;PERICOLACE   Take 1-2 tablets by mouth 2 times daily Take while on oral narcotics to prevent or treat constipation.                                UNABLE TO FIND   MEDICATION NAME: Herbalife Total Control                                VENTOLIN  (90 BASE) MCG/ACT Inhaler   Generic drug:  albuterol                                YOHIMBE PO   Reported on 5/11/2017

## 2017-06-15 NOTE — ANESTHESIA POSTPROCEDURE EVALUATION
Patient: Boris Jimenez    Procedure(s):  Right Parotidectomy with continuous facial nerve monitoring - Wound Class: I-Clean    Diagnosis:Parotid Mass  Diagnosis Additional Information: No value filed.    Anesthesia Type:  General, ETT    Note:  Anesthesia Post Evaluation    Patient location during evaluation: PACU  Patient participation: Able to fully participate in evaluation  Level of consciousness: awake  Pain management: adequate  Airway patency: patent  Cardiovascular status: acceptable  Respiratory status: acceptable  Hydration status: acceptable  PONV: none     Anesthetic complications: None          Last vitals:  Vitals:    06/15/17 1230 06/15/17 1242 06/15/17 1300   BP: 126/80 129/79 119/67   Pulse:      Resp: 10 12    Temp: 36.3  C (97.3  F) 36  C (96.8  F) 35.9  C (96.6  F)   SpO2: 94% 93% 94%         Electronically Signed By: Matt Barnett MD  Brenda 15, 2017  1:44 PM

## 2017-06-15 NOTE — IP AVS SNAPSHOT
[facility]    [address] [place] [id number]    Phone:  [phone]    Fax:  [phone]                                       After Visit Summary   6/15/2017    Boris Jimenez    MRN: [id number]           After Visit Summary Signature Page     I have received my discharge instructions, and my questions have been answered. I have discussed any challenges I see with this plan with the nurse or doctor.    ..........................................................................................................................................  Patient/Patient Representative Signature      ..........................................................................................................................................  Patient Representative Print Name and Relationship to Patient    ..................................................               ................................................  Date                                            Time    ..........................................................................................................................................  Reviewed by Signature/Title    ...................................................              ..............................................  Date                                                            Time

## 2017-06-16 ENCOUNTER — CARE COORDINATION (OUTPATIENT)
Dept: OTOLARYNGOLOGY | Facility: CLINIC | Age: 47
End: 2017-06-16

## 2017-06-16 ENCOUNTER — ALLIED HEALTH/NURSE VISIT (OUTPATIENT)
Dept: NURSING | Facility: CLINIC | Age: 47
End: 2017-06-16
Payer: COMMERCIAL

## 2017-06-16 DIAGNOSIS — D11.0 PLEOMORPHIC ADENOMA OF PAROTID GLAND: Primary | ICD-10-CM

## 2017-06-16 LAB — COPATH REPORT: NORMAL

## 2017-06-16 PROCEDURE — 99207 ZZC NO CHARGE NURSE ONLY: CPT

## 2017-06-16 NOTE — PROGRESS NOTES
Pt here for Chet-Engle drain removal from right neck incision.  Suture intact, tubing taped securely to chest.  Approximately 20cc of serosanguinous drainage noted in bulb.  Suture and drain removed without difficulty, dressing applied to site.  Pt to call Dr Warren's office for further questions or concerns.  Emily Palbo RN

## 2017-06-16 NOTE — MR AVS SNAPSHOT
After Visit Summary   6/16/2017    Boris Jimenez    MRN: [id number]           Patient Information     Date Of Birth          1970        Visit Information        Provider Department      6/16/2017 1:00 PM PROC RM 1 [facility]        Today's Diagnoses     Pleomorphic adenoma of parotid gland    -  1       Follow-ups after your visit        Your next 10 appointments already scheduled     Jun 23, 2017  2:40 PM CDT   (Arrive by 2:25 PM)   Return Visit with Amie Warren MD   [facility] ([facility])    [address] [place] [address] [phone]              Who to contact     If you have questions or need follow up information about today's clinic visit or your schedule please contact [facility] directly at [phone].  Normal or non-critical lab and imaging results will be communicated to you by Sun[employer], letter or phone within 4 business days after the clinic has received the results. If you do not hear from us within 7 days, please contact the clinic through [employer] or phone. If you have a critical or abnormal lab result, we will notify you by phone as soon as possible.  Submit refill requests through [email or web address] or call your pharmacy and they will forward the refill request to us. Please allow 3 business days for your refill to be completed.          Additional Information About Your Visit        MyChart Information     [email or web address] gives you secure access to your electronic health record. If you see a primary care provider, you can also send messages to your care team and make appointments. If you have questions, please call your primary care clinic.  If you do not have a primary care provider, please call [phone] and they will assist you.      [email or web address] is an electronic gateway that provides easy, online access to your medical records. With [email or web address], you can request a clinic  appointment, read your test results, renew a prescription or communicate with your care team.     To access your existing account, please contact your [facility] or call [phone] for assistance.        Care EveryWhere ID     This is your Care EveryWhere ID. This could be used by other organizations to access your [facility] medical records  [id number]         Blood Pressure from Last 3 Encounters:   06/15/17 129/72   05/19/17 132/86    Weight from Last 3 Encounters:   06/15/17 88.5 kg (195 lb)   05/19/17 88.5 kg (195 lb)              Today, you had the following     No orders found for display       Primary Care Provider    [facility]       No address on file        Thank you!     Thank you for choosing [facility]  for your care. Our goal is always to provide you with excellent care. Hearing back from our patients is one way we can continue to improve our services. Please take a few minutes to complete the written survey that you may receive in the mail after your visit with us. Thank you!             Your Updated Medication List - Protect others around you: Learn how to safely use, store and throw away your medicines at www.disposemymeds.org.          This list is accurate as of: 6/16/17  1:23 PM.  Always use your most recent med list.                   Brand Name Dispense Instructions for use    AMINO ACIDS PO      Take by mouth three times a week       BEE POLLEN PO          D3 ADULT PO          multivitamin, therapeutic Tabs tablet      Take 1 tablet by mouth daily       oxyCODONE 5 MG IR tablet    ROXICODONE    30 tablet    Take 1-2 tablets (5-10 mg) by mouth every 3 hours as needed for pain or other (Moderate to Severe)       SAW PALMETTO EXTRACT PO          senna-docusate 8.6-50 MG per tablet    SENOKOT-S;PERICOLACE    30 tablet    Take 1-2 tablets by mouth 2 times daily Take while on oral narcotics to prevent or treat constipation.        UNABLE TO FIND      MEDICATION NAME: Herbalife Total Control       VENTOLIN  (90 BASE) MCG/ACT Inhaler   Generic drug:  albuterol          YOHIMBE PO      Reported on 5/11/2017

## 2017-06-16 NOTE — PROGRESS NOTES
I called Boris to check in this morning after his surgery yesterday  His SANDOR drain is intact draining serosang fluid  He has had 20cc out since yesterday.  He is scheduled to have the drain removed today in [place] at 1pm.   He will f/u with Dr. Warren next week  He knows to call me if ?'s arise

## 2017-06-17 NOTE — OP NOTE
Date of Procedure: 6/15/2017    Attending Physician: Amie Warren MD    Resident Physicians: Letty Rivera MD    Procedure Performed:  Right superficial parotidectomy with facial nerve dissection  Continuous facial nerve monitoring    Preoperative Diagnosis: pleomorphic adenoma    Postoperative Diagnosis: same    Anesthesia: General    Blood loss: 30 cc    Specimens:   1. Right parotid mass    Implants:  SANDOR drain x 1    Complications: None    Findings:  Mass of right anterior parotid, overlying the masseter muscle, between the buccal and zygomatic nerve branches  Facial nerve stimulates along midface branches at 0.8 mA at end of case    Indications:  Boris Jimenez is a 47 year old man with a pleomorphic adenoma of the anterior parotid, overlying the masseter muscle, who is indicated for a right parotidectomy.    Description of Procedure:  After informed consent was obtained, the patient was transported to the main operating room. General anesthesia was induced and the patient was orotracheally intubated. No long-term paralysis was used. The bed was turned 90 degrees from anesthesia.  The NIMS facial nerve monitor was placed and tested. The patient was prepped and draped in the normal sterile fashion so the entire right side of the face could be seen. A time out was performed. A modified Harrison incision was marked and injected with 1:100,000 epinephrine. A 15 blade was used to make the incision. The platysma was identified along with the external jugular vein and the greater auricular nerve. The platysma was divided inferiorly and a subplatysmal flap was raised superiorly. This was connected to a flap that was raised over the parotid fascia. The parotid flap was raised anteriorly past the palpable mass over the masseter. Given the location of the mass, the decision was made to not identify the main trunk of the facial nerve but instead perform retrograde dissection of the nerve along the zygomatic and buccal  branches which were adjacent to the tumor.     The nerve stimulator was used to verify the identity of the distal buccal nerve branch that could be seen just inferior to the mass. Retrograde dissection was performed along the nerve back proximally past the tumor. Anterograde dissection was then performed along branches off the main buccal branch to ensure preservation of the distal branches.  Once these buccal nerve branches were identified, the tumor was started to be released with a cuff of the surrounding parotid tissue. Of note, given the anterior location of the tumor there was minimal parotid tissue around the mass. The masseter muscle was identified on the inferior border of the tumor and a small cuff of muscle was taken with the tumor. We continued to release along the anterior border of the tumor. As we continued to release the tumor and the surrounding parotid working from inferior to superior we then identified one of the zygomatic branches. This was similarly dissected in retrograde fashion along the nerve past the posterior border of the tumor. The tumor was continued to be dissected in continuity with a cuff of the adjacent parotid and a small amount of masseter. Once the specimen was completely released, it was handed off to nursing for permanent pathology.    The wound was irrigated with saline. Meticulous hemostasis was achieved. A sustained valsalva was performed multiple times. The entire face stimulated at 0.8 mV along the proximal zygomatic and buccal branches. A 7 mm fully perforated SANDOR drain was then placed. The skin was closed deeply with buried interrupted 3-0 vicryl, and then a running 5-0 prolene. Care was turned over to anesthesiology for extubation. The patient was transported to the PACU in stable condition with no immediate complications.     I was present for and participated in the entire procedure.      Amie Warren MD    Department of Otolaryngology

## 2017-06-23 ENCOUNTER — OFFICE VISIT (OUTPATIENT)
Dept: OTOLARYNGOLOGY | Facility: CLINIC | Age: 47
End: 2017-06-23

## 2017-06-23 VITALS
DIASTOLIC BLOOD PRESSURE: 77 MMHG | HEART RATE: 63 BPM | BODY MASS INDEX: 29.16 KG/M2 | SYSTOLIC BLOOD PRESSURE: 111 MMHG | WEIGHT: 196 LBS | OXYGEN SATURATION: 93 %

## 2017-06-23 DIAGNOSIS — D11.0 PLEOMORPHIC ADENOMA OF PAROTID GLAND: Primary | ICD-10-CM

## 2017-06-23 RX ORDER — OMEGA-3-ACID ETHYL ESTERS 1 G/1
2 CAPSULE, LIQUID FILLED ORAL 2 TIMES DAILY
COMMUNITY

## 2017-06-23 ASSESSMENT — PAIN SCALES - GENERAL: PAINLEVEL: NO PAIN (0)
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
## 2017-06-23 NOTE — NURSING NOTE
Sutures removed without difficulty. Review incision care with patient. Follow up with Dr. Warren in 4 weeks.    Melly Rollins RN, BSN.  6/23/2017 3:50 PM

## 2017-06-23 NOTE — MR AVS SNAPSHOT
After Visit Summary   6/23/2017    Boris Jimenez    MRN: [id number]           Patient Information     Date Of Birth          1970        Visit Information        Provider Department      6/23/2017 2:40 PM Amie Warren MD [facility]         Follow-ups after your visit        Your next 10 appointments already scheduled     Jul 28, 2017  2:40 PM CDT   (Arrive by 2:25 PM)   Return Visit with MD TAM Alves [facility] Ear Nose and Throat ([facility])    [address] [place] [id number] [phone]              Who to contact     Please call your clinic at [phone] to:    Ask questions about your health    Make or cancel appointments    Discuss your medicines    Learn about your test results    Speak to your doctor   If you have compliments or concerns about an experience at your clinic, or if you wish to file a complaint, please contact [facility] Patient Relations at [phone] or email us at [email or web address]         Additional Information About Your Visit        MyChart Information     Hy[employer] gives you secure access to your electronic health record. If you see a primary care provider, you can also send messages to your care team and make appointments. If you have questions, please call your primary care clinic.  If you do not have a primary care provider, please call [phone] and they will assist you.      optionsXpress is an electronic gateway that provides easy, online access to your medical records. With optionsXpress, you can request a clinic appointment, read your test results, renew a prescription or communicate with your care team.     To access your existing account, please contact your [facility] or call [phone] for assistance.        Care EveryWhere ID     This is your Care EveryWhere ID. This could be used by other  organizations to access your [place] medical records  [id number]        Your Vitals Were     Pulse Pulse Oximetry BMI (Body Mass Index)             63 93% 29.16 kg/m2          Blood Pressure from Last 3 Encounters:   06/23/17 111/77   06/15/17 129/72   05/19/17 132/86    Weight from Last 3 Encounters:   06/23/17 88.9 kg (196 lb)   06/15/17 88.5 kg (195 lb)   05/19/17 88.5 kg (195 lb)              Today, you had the following     No orders found for display       Primary Care Provider    [facility]       No address on file        Equal Access to Services     ADELAIDA BAZAN : Hadii paulo [place], wacaseyda jennifer, qaybta kaalmada marquita, reuben cordoba . So [facility] [phone].    ATENCIÓN: Si habla español, tiene a bland disposición servicios gratuitos de asistencia lingüística. Llame al [phone].    We comply with applicable federal civil rights laws and [place] laws. We do not discriminate on the basis of race, color, national origin, age, disability sex, sexual orientation or gender identity.            Thank you!     Thank you for choosing [facility] EAR NOSE AND THROAT  for your care. Our goal is always to provide you with excellent care. Hearing back from our patients is one way we can continue to improve our services. Please take a few minutes to complete the written survey that you may receive in the mail after your visit with us. Thank you!             Your Updated Medication List - Protect others around you: Learn how to safely use, store and throw away your medicines at www.disposemymeds.org.          This list is accurate as of: 6/23/17  3:31 PM.  Always use your most recent med list.                   Brand Name Dispense Instructions for use Diagnosis    AMINO ACIDS PO      Take by mouth three times a week        BEE POLLEN PO           D3 ADULT PO           multivitamin, therapeutic Tabs tablet      Take 1 tablet by mouth daily         omega-3 acid ethyl esters 1 G capsule    Lovaza     Take 2 g by mouth 2 times daily        oxyCODONE 5 MG IR tablet    ROXICODONE    30 tablet    Take 1-2 tablets (5-10 mg) by mouth every 3 hours as needed for pain or other (Moderate to Severe)    Parotid mass       SAW PALMETTO EXTRACT PO           senna-docusate 8.6-50 MG per tablet    SENOKOT-S;PERICOLACE    30 tablet    Take 1-2 tablets by mouth 2 times daily Take while on oral narcotics to prevent or treat constipation.    Parotid mass       UNABLE TO FIND      MEDICATION NAME: Herbalife Total Control        VENTOLIN  (90 BASE) MCG/ACT Inhaler   Generic drug:  albuterol           YOHIMBE PO      Reported on 5/11/2017
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
## 2017-06-23 NOTE — NURSING NOTE
Chief Complaint   Patient presents with     RECHECK     Return Post op 6/15/2017     Pt states no pain today.    TALI Benitez LPN

## 2017-07-28 ENCOUNTER — OFFICE VISIT (OUTPATIENT)
Dept: OTOLARYNGOLOGY | Facility: CLINIC | Age: 47
End: 2017-07-28

## 2017-07-28 VITALS — BODY MASS INDEX: 29.86 KG/M2 | WEIGHT: 197 LBS | HEIGHT: 68 IN

## 2017-07-28 DIAGNOSIS — D11.0 PLEOMORPHIC ADENOMA OF PAROTID GLAND: Primary | ICD-10-CM

## 2017-07-28 ASSESSMENT — PAIN SCALES - GENERAL: PAINLEVEL: NO PAIN (0)

## 2017-07-28 NOTE — MR AVS SNAPSHOT
After Visit Summary   7/28/2017    Boris Jimenez    MRN: [id number]           Patient Information     Date Of Birth          1970        Visit Information        Provider Department      7/28/2017 2:40 PM Amie Warren MD [facility]         Follow-ups after your visit        Your next 10 appointments already scheduled     Jul 28, 2017  2:40 PM CDT   (Arrive by 2:25 PM)   Return Visit with MD TAM Alves [facility] Ear Nose and Throat ([facility])    [address] [place] [address] [phone]            Feb 02, 2018  1:00 PM CST   (Arrive by 12:45 PM)   Return Visit with MD TAM Alves [facility] Ear Nose and Throat [facility])    [address] [place] [address] [phone]              Who to contact     Please call your clinic at [phone] to:    Ask questions about your health    Make or cancel appointments    Discuss your medicines    Learn about your test results    Speak to your doctor   If you have compliments or concerns about an experience at your clinic, or if you wish to file a complaint, please contact [facility] Patient Relations at [phone] or email us at [email or web address]         Additional Information About Your Visit        MyChart Information     [email or web address] gives you secure access to your electronic health record. If you see a primary care provider, you can also send messages to your care team and make appointments. If you have questions, please call your primary care clinic.  If you do not have a primary care provider, please call [phone] and they will assist you.      Appfolio is an electronic gateway that provides easy, online access to your medical records. With Appfolio, you can request a clinic appointment, read your test results, renew a prescription or communicate with your  "care team.     To access your existing account, please contact your [facility] or call [phone] for assistance.        Care EveryWhere ID     This is your Care EveryWhere ID. This could be used by other organizations to access your [place] medical records  [id number]        Your Vitals Were     Height BMI (Body Mass Index)                1.727 m (5' 8\") 29.95 kg/m2           Blood Pressure from Last 3 Encounters:   06/23/17 111/77   06/15/17 129/72   05/19/17 132/86    Weight from Last 3 Encounters:   07/28/17 89.4 kg (197 lb)   06/23/17 88.9 kg (196 lb)   06/15/17 88.5 kg (195 lb)              Today, you had the following     No orders found for display       Primary Care Provider    [facility]       No address on file        Equal Access to Services     ADELAIDA BAZAN : Hadii paulo garcia Sothom, waaxda luqadaha, qaybta kaalmada marquita, reuben cordoba . So [facility] [phone].    ATENCIÓN: Si habla español, tiene a bland disposición servicios gratuitos de asistencia lingüística. Llame al [phone].    We comply with applicable federal civil rights laws and [place] laws. We do not discriminate on the basis of race, color, national origin, age, disability sex, sexual orientation or gender identity.            Thank you!     Thank you for choosing [facility] NOSE [facility] TH[facility]AT  for your care. Our goal is always to provide you with excellent care. Hearing back from our patients is one way we can continue to improve our services. Please take a few minutes to complete the written survey that you may receive in the mail after your visit with us. Thank you!             Your Updated Medication List - Protect others around you: Learn how to safely use, store and throw away your medicines at www.disposemymeds.org.          This list is accurate as of: 7/28/17  2:36 PM.  Always use your most recent med list.                   Brand " Name Dispense Instructions for use Diagnosis    AMINO ACIDS PO      Take by mouth three times a week        BEE POLLEN PO           D3 ADULT PO           multivitamin, therapeutic Tabs tablet      Take 1 tablet by mouth daily        omega-3 acid ethyl esters 1 G capsule    Lovaza     Take 2 g by mouth 2 times daily        oxyCODONE 5 MG IR tablet    ROXICODONE    30 tablet    Take 1-2 tablets (5-10 mg) by mouth every 3 hours as needed for pain or other (Moderate to Severe)    Parotid mass       SAW PALMETTO EXTRACT PO           senna-docusate 8.6-50 MG per tablet    SENOKOT-S;PERICOLACE    30 tablet    Take 1-2 tablets by mouth 2 times daily Take while on oral narcotics to prevent or treat constipation.    Parotid mass       UNABLE TO FIND      MEDICATION NAME: Herbalife Total Control        VENTOLIN  (90 BASE) MCG/ACT Inhaler   Generic drug:  albuterol           VADIMMBE PO      Reported on 5/11/2017

## 2017-07-28 NOTE — PATIENT INSTRUCTIONS
Please follow up in 6 months to see Dr Warren.  For questions or concerns please call the RN ENT triage line at [phone] option #3.
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
## 2017-07-28 NOTE — NURSING NOTE
Chief Complaint   Patient presents with     RECHECK     4 week f/u       Regine Ascencoi Medical Assistant

## 2018-02-09 ENCOUNTER — OFFICE VISIT (OUTPATIENT)
Dept: OTOLARYNGOLOGY | Facility: CLINIC | Age: 48
End: 2018-02-09
Payer: COMMERCIAL

## 2018-02-09 VITALS — BODY MASS INDEX: 30.31 KG/M2 | WEIGHT: 200 LBS | HEIGHT: 68 IN

## 2018-02-09 DIAGNOSIS — D11.0 PLEOMORPHIC ADENOMA OF PAROTID GLAND: Primary | ICD-10-CM

## 2018-02-09 ASSESSMENT — PAIN SCALES - GENERAL: PAINLEVEL: NO PAIN (0)

## 2018-02-09 NOTE — NURSING NOTE
Chief Complaint   Patient presents with     RECHECK     6 month follow up     Regine Ascencio Medical Assistant
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
## 2018-02-09 NOTE — MR AVS SNAPSHOT
"              After Visit Summary   2/9/2018    Boris Jimenez    MRN: [id number]           Patient Information     Date Of Birth          1970        Visit Information        Provider Department      2/9/2018 2:00 PM Amie Warren MD [facility] Ear Nose and Throat        Today's Diagnoses     Pleomorphic adenoma of parotid gland    -  1      Care Instructions    1. Please follow-up in clinic as needed with Dr. Warren  2. Please call the ENT clinic with any questions,concerns, new or worsening symptoms.    -Clinic number is [phone]   - Maria Alejandra's direct line (Dr. Warren's nurse) [phone]              Follow-ups after your visit        Who to contact     Please call your clinic at [phone] to:    Ask questions about your health    Make or cancel appointments    Discuss your medicines    Learn about your test results    Speak to your doctor            Additional Information About Your Visit        [employer] Information     [employer] gives you secure access to your electronic health record. If you see a primary care provider, you can also send messages to your care team and make appointments. If you have questions, please call your primary care clinic.  If you do not have a primary care provider, please call [phone] and they will assist you.      [employer] is an electronic gateway that provides easy, online access to your medical records. With [employer], you can request a clinic appointment, read your test results, renew a prescription or communicate with your care team.     To access your existing account, please contact your [facility] or call [phone] for assistance.        Care EveryWhere ID     This is your Care EveryWhere ID. This could be used by other organizations to access your [place] medical records  [id number]        Your Vitals Were     Height BMI (Body Mass Index)                1.727 m (5' 7.99\") 30.42 kg/m2           Blood Pressure from Last 3 " Encounters:   06/23/17 111/77   06/15/17 129/72   05/19/17 132/86    Weight from Last 3 Encounters:   02/09/18 90.7 kg (200 lb)   07/28/17 89.4 kg (197 lb)   06/23/17 88.9 kg (196 lb)              Today, you had the following     No orders found for display       Primary Care Provider Office Phone # Fax #    [facility] [phone]       [address]  [facility] [address]        Equal Access to Services     [facility]: Hadii aad ku hadasho Soomaali, waaxda luqadaha, qaybta kaalmada adeegyada, reuben cordoba . So [facility] [phone].    ATENCIÓN: Si habla español, tiene a bland disposición servicios gratuitos de asistencia lingüística. [facility] [phone].    We comply with applicable federal civil rights laws and [place] laws. We do not discriminate on the basis of race, color, national origin, age, disability, sex, sexual orientation, or gender identity.            Thank you!     Thank you for choosing [facility] EAR NOSE AND THROAT  for your care. Our goal is always to provide you with excellent care. Hearing back from our patients is one way we can continue to improve our services. Please take a few minutes to complete the written survey that you may receive in the mail after your visit with us. Thank you!             Your Updated Medication List - Protect others around you: Learn how to safely use, store and throw away your medicines at www.disposemymeds.org.          This list is accurate as of 2/9/18  4:16 PM.  Always use your most recent med list.                   Brand Name Dispense Instructions for use Diagnosis    AMINO ACIDS PO      Take by mouth three times a week        BEE POLLEN PO           D3 ADULT PO           multivitamin, therapeutic Tabs tablet      Take 1 tablet by mouth daily        omega-3 acid ethyl esters 1 G capsule    Lovaza     Take 2 g by mouth 2 times daily        oxyCODONE IR 5 MG tablet    ROXICODONE    30 tablet     Take 1-2 tablets (5-10 mg) by mouth every 3 hours as needed for pain or other (Moderate to Severe)    Parotid mass       SAW PALMETTO EXTRACT PO           senna-docusate 8.6-50 MG per tablet    SENOKOT-S;PERICOLACE    30 tablet    Take 1-2 tablets by mouth 2 times daily Take while on oral narcotics to prevent or treat constipation.    Parotid mass       UNABLE TO FIND      MEDICATION NAME: Herbalife Total Control        VENTOLIN  (90 BASE) MCG/ACT Inhaler   Generic drug:  albuterol           YOHIMBE PO      Reported on 5/11/2017

## 2019-10-02 ENCOUNTER — HEALTH MAINTENANCE LETTER (OUTPATIENT)
Age: 49
End: 2019-10-02

## 2021-01-15 ENCOUNTER — HEALTH MAINTENANCE LETTER (OUTPATIENT)
Age: 51
End: 2021-01-15

## 2021-09-04 ENCOUNTER — HEALTH MAINTENANCE LETTER (OUTPATIENT)
Age: 51
End: 2021-09-04

## 2022-02-04 NOTE — ANESTHESIA PREPROCEDURE EVALUATION
Anesthesia Evaluation     . Pt has had prior anesthetic. Type: General    No history of anesthetic complications          ROS/MED HX    ENT/Pulmonary:  - neg pulmonary ROS     Neurologic:  - neg neurologic ROS     Cardiovascular:         METS/Exercise Tolerance:  >4 METS   Hematologic:  - neg hematologic  ROS       Musculoskeletal:  - neg musculoskeletal ROS       GI/Hepatic:     (+) hiatal hernia,       Renal/Genitourinary:  - ROS Renal section negative       Endo:  - neg endo ROS       Psychiatric:  - neg psychiatric ROS       Infectious Disease:  - neg infectious disease ROS       Malignancy:      - no malignancy   Other:    - neg other ROS                 Physical Exam      Airway   Mallampati: II  TM distance: >3 FB  Neck ROM: full    Dental   (+) chipped    Cardiovascular   Rhythm and rate: regular      Pulmonary    breath sounds clear to auscultation                    Anesthesia Plan      History & Physical Review  History and physical reviewed and following examination; no interval change.    ASA Status:  2 .    NPO Status:  > 8 hours    Plan for General and ETT with Intravenous induction. Maintenance will be Balanced.    PONV prophylaxis:  Ondansetron (or other 5HT-3) and Dexamethasone or Solumedrol       Postoperative Care  Postoperative pain management:  IV analgesics.      Consents  Anesthetic plan, risks, benefits and alternatives discussed with:  Patient..                          .   Addended by: PANCHO DAMON on: 2/4/2022 04:26 PM     Modules accepted: Orders

## 2022-02-19 ENCOUNTER — HEALTH MAINTENANCE LETTER (OUTPATIENT)
Age: 52
End: 2022-02-19

## 2022-10-22 ENCOUNTER — HEALTH MAINTENANCE LETTER (OUTPATIENT)
Age: 52
End: 2022-10-22

## 2023-04-01 ENCOUNTER — HEALTH MAINTENANCE LETTER (OUTPATIENT)
Age: 53
End: 2023-04-01

## 2023-05-23 NOTE — PATIENT INSTRUCTIONS
1. Please follow-up in clinic as needed with Dr. Warren  2. Please call the ENT clinic with any questions,concerns, new or worsening symptoms.    -Clinic number is [phone]   - Maria Alejandra's direct line (Dr. Warren's nurse) [phone]       115

## 2023-08-10 ENCOUNTER — TRANSCRIBE ORDERS (OUTPATIENT)
Dept: OTHER | Age: 53
End: 2023-08-10

## 2023-08-10 DIAGNOSIS — K11.8 PAROTID DUCT OBSTRUCTION: Primary | ICD-10-CM

## 2023-09-13 ENCOUNTER — OFFICE VISIT (OUTPATIENT)
Dept: OTOLARYNGOLOGY | Facility: CLINIC | Age: 53
End: 2023-09-13
Attending: FAMILY MEDICINE
Payer: COMMERCIAL

## 2023-09-13 VITALS — SYSTOLIC BLOOD PRESSURE: 127 MMHG | HEART RATE: 63 BPM | DIASTOLIC BLOOD PRESSURE: 86 MMHG

## 2023-09-13 DIAGNOSIS — K11.8 PAROTID DUCT OBSTRUCTION: ICD-10-CM

## 2023-09-13 DIAGNOSIS — K11.5 PAROTID SIALOLITHIASIS: Primary | ICD-10-CM

## 2023-09-13 DIAGNOSIS — K11.21 ACUTE SUPPURATIVE PAROTITIS: ICD-10-CM

## 2023-09-13 PROCEDURE — 99204 OFFICE O/P NEW MOD 45 MIN: CPT | Mod: 25 | Performed by: OTOLARYNGOLOGY

## 2023-09-13 PROCEDURE — 42330 REMOVAL OF SALIVARY STONE: CPT | Performed by: OTOLARYNGOLOGY

## 2023-09-13 ASSESSMENT — ENCOUNTER SYMPTOMS
SINUS PAIN: 0
BRUISES/BLEEDS EASILY: 0
CONSTITUTIONAL NEGATIVE: 1
COUGH: 0
SORE THROAT: 0
HEARTBURN: 0
HEMOPTYSIS: 0
DIZZINESS: 0
HEADACHES: 0
TREMORS: 0
VOMITING: 0
STRIDOR: 0
BLURRED VISION: 0
TINGLING: 0
PHOTOPHOBIA: 0
NAUSEA: 0
SPUTUM PRODUCTION: 0
DOUBLE VISION: 0

## 2023-09-13 NOTE — LETTER
9/13/2023         RE: Boris Jimenez  [address] [address]        Dear Colleague,    Thank you for referring your patient, Boris Jimenez, to the [facility]. Please see a copy of my visit note below.    Boris Jimenez's chief complaint for this visit includes:  Chief Complaint   Patient presents with     Consult     Parotid duct obstruction. Seen at [facility] 8-11-23, Had CT neck done for Cellulitis face right side, also reacted to Augmentin so changed to Doxycycline. Finished 2 weeks ago. Concerned the obstruction is caused by a mass that was removed from right cheek 5 years ago.      PCP: [facility] [place] [facility]    Referring Provider:  Geovanni Kelly MD  [place]  [place] [address]    /86   Pulse 63             HPI    Boris Jimenez's chief complaint for this visit includes:  Chief Complaint   Patient presents with     Consult     Parotid duct obstruction. Seen at [facility] 8-11-23, Had CT neck done for Cellulitis face right side, also reacted to Augmentin so changed to Doxycycline. Finished 2 weeks ago. Concerned the obstruction is caused by a mass that was removed from right cheek 5 years ago.      PCP: [facility] [place] [facility]    Referring Provider:  Geovanni Kelly MD  [facility]  [address]  [place] [address]    /86   Pulse 63     This pleasant patient is having acute parotitis. Treated with antibiotics and admitted to hospital. He is doing much better today in terms of pain, pressure. Denies any dysphagia, odynophagia, or hoarseness. He has a hx of superficial parotidectomy due to right pleomorphic adenoma.     His CT showed:   EXAM: CT NECK SOFT TISSUE W CON    DATE: 8/11/2023 9:14 PM    COMPARISON: None    CLINICAL DATA: Mass.    TECHNIQUE: Thin-section contiguous transaxial images were  obtained through the neck.  Images through the neck were reformatted in the coronal plane.  A total of 100 cc of Omnipaque 350 were administered intravenously for this study.    FINDINGS: There is significant asymmetric enlargement and diffuse hyperenhancement of the right parotid gland. Submandibular glands are symmetric. Mucosal spaces of the neck are symmetric. Parapharyngeal spaces are normal in appearance. Epiglottis is unremarkable. Vocal cords are symmetric. Thyroid is grossly normal in appearance. A couple mildly prominent right cervical lymph nodes are presumably reactive. Cervical spine alignment is anatomic.    Exam End: 08/11/23  9:19 PM       Review of Systems   Constitutional: Negative.    HENT:  Positive for congestion. Negative for ear discharge, ear pain, hearing loss, nosebleeds, sinus pain, sore throat and tinnitus.    Eyes:  Negative for blurred vision, double vision and photophobia.   Respiratory:  Negative for cough, hemoptysis, sputum production and stridor.    Gastrointestinal:  Negative for heartburn, nausea and vomiting.   Skin: Negative.    Neurological:  Negative for dizziness, tingling, tremors and headaches.   Endo/Heme/Allergies:  Negative for environmental allergies. Does not bruise/bleed easily.         Physical Exam  Vitals and nursing note reviewed.   Constitutional:       Appearance: Normal appearance.   HENT:      Head: Normocephalic and atraumatic.      Jaw: There is normal jaw occlusion.      Right Ear: Tympanic membrane, ear canal and external ear normal. No middle ear effusion. There is no impacted cerumen.      Left Ear: Tympanic membrane, ear canal and external ear normal.  No middle ear effusion. There is no impacted cerumen.      Nose: Mucosal edema present. No congestion or rhinorrhea.      Right Turbinates: Not enlarged or swollen.      Left Turbinates: Not enlarged or swollen.      Mouth/Throat:      Mouth: Mucous membranes are moist.      Pharynx: Oropharynx is clear.  Uvula midline.   Eyes:      Extraocular Movements: Extraocular movements intact.      Pupils: Pupils are equal, round, and reactive to light.   Neurological:      Mental Status: He is alert.       Right parotid gland: The opening of Stensen duct of right parotid gland was numbed with lidocaine viscous gel for 10 minutes. The duct was dilated with 3 rigid dilator. Several salivary gland stones were removed. Clear salivary secretion was observed. He tolerated the procedure well.  His questions were answered. F/up in two months. In the meantime, good hydration, parotid massages and warm pad application.        Again, thank you for allowing me to participate in the care of your patient.        Sincerely,        Kb Franz MD

## 2023-09-13 NOTE — NURSING NOTE
Boris Jimenez's chief complaint for this visit includes:  Chief Complaint   Patient presents with    Consult     Parotid duct obstruction. Seen at [facility] 8-11-23, Had CT neck done for Cellulitis face right side, also reacted to Augmentin so changed to Doxycycline. Finished 2 weeks ago. Concerned the obstruction is caused by a mass that was removed from right cheek 5 years ago.      PCP: [facility]    Referring Provider:  Geovanni Kelly MD  [facility]  [address]  [place] [address]    /86   Pulse 63

## 2023-09-13 NOTE — PROGRESS NOTES
HPI    Boris Jimenez's chief complaint for this visit includes:  Chief Complaint   Patient presents with    Consult     Parotid duct obstruction. Seen at [facility] 8-11-23, Had CT neck done for Cellulitis face right side, also reacted to Augmentin so changed to Doxycycline. Finished 2 weeks ago. Concerned the obstruction is caused by a mass that was removed from right cheek 5 years ago.      PCP: Michelle - [facility], [facility]    Referring Provider:  Geovanni Kelly MD  [facility]  [address]  [place] [address]    /86   Pulse 63     This pleasant patient is having acute parotitis. Treated with antibiotics and admitted to hospital. He is doing much better today in terms of pain, pressure. Denies any dysphagia, odynophagia, or hoarseness. He has a hx of superficial parotidectomy due to right pleomorphic adenoma.     His CT showed:   EXAM: CT NECK SOFT TISSUE W CON    DATE: 8/11/2023 9:14 PM    COMPARISON: None    CLINICAL DATA: Mass.    TECHNIQUE: Thin-section contiguous transaxial images were obtained through the neck.  Images through the neck were reformatted in the coronal plane.  A total of 100 cc of Omnipaque 350 were administered intravenously for this study.    FINDINGS: There is significant asymmetric enlargement and diffuse hyperenhancement of the right parotid gland. Submandibular glands are symmetric. Mucosal spaces of the neck are symmetric. Parapharyngeal spaces are normal in appearance. Epiglottis is unremarkable. Vocal cords are symmetric. Thyroid is grossly normal in appearance. A couple mildly prominent right cervical lymph nodes are presumably reactive. Cervical spine alignment is anatomic.    Exam End: 08/11/23  9:19 PM       Review of Systems   Constitutional: Negative.    HENT:  Positive for congestion. Negative for ear discharge, ear pain, hearing loss, nosebleeds, sinus pain, sore throat and tinnitus.    Eyes:  Negative for blurred  vision, double vision and photophobia.   Respiratory:  Negative for cough, hemoptysis, sputum production and stridor.    Gastrointestinal:  Negative for heartburn, nausea and vomiting.   Skin: Negative.    Neurological:  Negative for dizziness, tingling, tremors and headaches.   Endo/Heme/Allergies:  Negative for environmental allergies. Does not bruise/bleed easily.         Physical Exam  Vitals and nursing note reviewed.   Constitutional:       Appearance: Normal appearance.   HENT:      Head: Normocephalic and atraumatic.      Jaw: There is normal jaw occlusion.      Right Ear: Tympanic membrane, ear canal and external ear normal. No middle ear effusion. There is no impacted cerumen.      Left Ear: Tympanic membrane, ear canal and external ear normal.  No middle ear effusion. There is no impacted cerumen.      Nose: Mucosal edema present. No congestion or rhinorrhea.      Right Turbinates: Not enlarged or swollen.      Left Turbinates: Not enlarged or swollen.      Mouth/Throat:      Mouth: Mucous membranes are moist.      Pharynx: Oropharynx is clear. Uvula midline.   Eyes:      Extraocular Movements: Extraocular movements intact.      Pupils: Pupils are equal, round, and reactive to light.   Neurological:      Mental Status: He is alert.       Right parotid gland: The opening of Stensen duct of right parotid gland was numbed with lidocaine viscous gel for 10 minutes. The duct was dilated with 3 rigid dilator. Several salivary gland stones were removed. Clear salivary secretion was observed. He tolerated the procedure well.  His questions were answered. F/up in two months. In the meantime, good hydration, parotid massages and warm pad application.

## 2023-09-13 NOTE — PROGRESS NOTES
Boris Jimenez's chief complaint for this visit includes:  Chief Complaint   Patient presents with    Consult     Parotid duct obstruction. Seen at [facility] 8-11-23, Had CT neck done for Cellulitis face right side, also reacted to Augmentin so changed to Doxycycline. Finished 2 weeks ago. Concerned the obstruction is caused by a mass that was removed from right cheek 5 years ago.      PCP: Michelle - [place], [facility]    Referring Provider:  Geovanni Kelly MD  [facility]  [address]  [place] [address]    /86   Pulse 63

## 2023-12-06 ENCOUNTER — OFFICE VISIT (OUTPATIENT)
Dept: OTOLARYNGOLOGY | Facility: CLINIC | Age: 53
End: 2023-12-06
Payer: COMMERCIAL

## 2023-12-06 VITALS — HEART RATE: 74 BPM | DIASTOLIC BLOOD PRESSURE: 82 MMHG | SYSTOLIC BLOOD PRESSURE: 117 MMHG

## 2023-12-06 DIAGNOSIS — D11.0 PLEOMORPHIC ADENOMA OF PAROTID GLAND: Primary | ICD-10-CM

## 2023-12-06 PROCEDURE — 99214 OFFICE O/P EST MOD 30 MIN: CPT | Performed by: OTOLARYNGOLOGY

## 2023-12-06 NOTE — PROGRESS NOTES
Boris Jimenez's chief complaint for this visit includes:  Chief Complaint   Patient presents with    Follow Up     Salivary Gland Stone     PCP: Michelle  [facility], [facility]    Referring Provider:  No referring provider defined for this encounter.    /82 (BP Location: Right arm, Patient Position: Sitting, Cuff Size: Adult Regular)   Pulse 74     Macie Mukherjee MA on 12/6/2023 at 1:55 PM    HPI    This pleasant patient is here for the f/up. I am glad to hear that he is doing well with no discomfort, pain, pressure, or fever. He has a hx of having acute parotitis which was treated with antibiotics and admitted to hospital.  Denies any dysphagia, odynophagia, or hoarseness. He has a hx of superficial parotidectomy due to right pleomorphic adenoma.     His CT showed:   EXAM: CT NECK SOFT TISSUE W CON    DATE: 8/11/2023 9:14 PM    COMPARISON: None    CLINICAL DATA: Mass.    TECHNIQUE: Thin-section contiguous transaxial images were obtained through the neck.  Images through the neck were reformatted in the coronal plane.  A total of 100 cc of Omnipaque 350 were administered intravenously for this study.    FINDINGS: There is significant asymmetric enlargement and diffuse hyperenhancement of the right parotid gland. Submandibular glands are symmetric. Mucosal spaces of the neck are symmetric. Parapharyngeal spaces are normal in appearance. Epiglottis is unremarkable. Vocal cords are symmetric. Thyroid is grossly normal in appearance. A couple mildly prominent right cervical lymph nodes are presumably reactive. Cervical spine alignment is anatomic.    Exam End: 08/11/23  9:19 PM       Review of Systems   Constitutional: Negative.    HENT:  Positive for congestion. Negative for ear discharge, ear pain, hearing loss, nosebleeds, sinus pain, sore throat and tinnitus.    Eyes:  Negative for blurred vision, double vision and photophobia.   Respiratory:  Negative for cough, hemoptysis,  sputum production and stridor.    Gastrointestinal:  Negative for heartburn, nausea and vomiting.   Skin: Negative.    Neurological:  Negative for dizziness, tingling, tremors and headaches.   Endo/Heme/Allergies:  Negative for environmental allergies. Does not bruise/bleed easily.         Physical Exam  Vitals and nursing note reviewed.   Constitutional:       Appearance: Normal appearance.   HENT:      Head: Normocephalic and atraumatic.      Jaw: There is normal jaw occlusion.      Right Ear: Tympanic membrane, ear canal and external ear normal. No middle ear effusion. There is no impacted cerumen.      Left Ear: Tympanic membrane, ear canal and external ear normal.  No middle ear effusion. There is no impacted cerumen.      Nose: Mucosal edema present. No congestion or rhinorrhea.      Right Turbinates: Not enlarged or swollen.      Left Turbinates: Not enlarged or swollen.      Mouth/Throat:      Mouth: Mucous membranes are moist.      Pharynx: Oropharynx is clear. Uvula midline.   Eyes:      Extraocular Movements: Extraocular movements intact.      Pupils: Pupils are equal, round, and reactive to light.   Neurological:      Mental Status: He is alert.     A/P  Due to slight asymmetry in his right parotid gland, an US is requested.   His questions were answered. F/up as scheduled. In the meantime, good hydration, parotid massages and warm pad application.

## 2023-12-06 NOTE — NURSING NOTE
Boris Jimenez's chief complaint for this visit includes:  Chief Complaint   Patient presents with    Follow Up     Salivary Gland Stone     PCP: Michelle - [place], [facility]    Referring Provider:  No referring provider defined for this encounter.    /82 (BP Location: Right arm, Patient Position: Sitting, Cuff Size: Adult Regular)   Pulse 74     Macie Mukherjee MA on 12/6/2023 at 1:55 PM

## 2023-12-06 NOTE — LETTER
12/6/2023         RE: Boris Jimenez  [address] [facility] [id number]        Dear Colleague,    Thank you for referring your patient, Boris Jimenez, to the [facility]. Please see a copy of my visit note below.    Boris Jimenez's chief complaint for this visit includes:  Chief Complaint   Patient presents with     Follow Up     Salivary Gland Stone     PCP: [facility]    Referring Provider:  No referring provider defined for this encounter.    /82 (BP Location: Right arm, Patient Position: Sitting, Cuff Size: Adult Regular)   Pulse 74     Macie Mukherjee MA on 12/6/2023 at 1:55 PM    HPI    This pleasant patient is here for the f/up. I am glad to hear that he is doing well with no discomfort, pain, pressure, or fever. He has a hx of having acute parotitis which was treated with antibiotics and admitted to hospital.  Denies any dysphagia, odynophagia, or hoarseness. He has a hx of superficial parotidectomy due to right pleomorphic adenoma.     His CT showed:   EXAM: CT NECK SOFT TISSUE W CON    DATE: 8/11/2023 9:14 PM    COMPARISON: None    CLINICAL DATA: Mass.    TECHNIQUE: Thin-section contiguous transaxial images were obtained through the neck.  Images through the neck were reformatted in the coronal plane.  A total of 100 cc of Omnipaque 350 were administered intravenously for this study.    FINDINGS: There is significant asymmetric enlargement and diffuse hyperenhancement of the right parotid gland. Submandibular glands are symmetric. Mucosal spaces of the neck are symmetric. Parapharyngeal spaces are normal in appearance. Epiglottis is unremarkable. Vocal cords are symmetric. Thyroid is grossly normal in appearance. A couple mildly prominent right cervical lymph nodes are presumably reactive. Cervical spine alignment is anatomic.    Exam End: 08/11/23  9:19 PM       Review of Systems   Constitutional:  Negative.    HENT:  Positive for congestion. Negative for ear discharge, ear pain, hearing loss, nosebleeds, sinus pain, sore throat and tinnitus.    Eyes:  Negative for blurred vision, double vision and photophobia.   Respiratory:  Negative for cough, hemoptysis, sputum production and stridor.    Gastrointestinal:  Negative for heartburn, nausea and vomiting.   Skin: Negative.    Neurological:  Negative for dizziness, tingling, tremors and headaches.   Endo/Heme/Allergies:  Negative for environmental allergies. Does not bruise/bleed easily.         Physical Exam  Vitals and nursing note reviewed.   Constitutional:       Appearance: Normal appearance.   HENT:      Head: Normocephalic and atraumatic.      Jaw: There is normal jaw occlusion.      Right Ear: Tympanic membrane, ear canal and external ear normal. No middle ear effusion. There is no impacted cerumen.      Left Ear: Tympanic membrane, ear canal and external ear normal.  No middle ear effusion. There is no impacted cerumen.      Nose: Mucosal edema present. No congestion or rhinorrhea.      Right Turbinates: Not enlarged or swollen.      Left Turbinates: Not enlarged or swollen.      Mouth/Throat:      Mouth: Mucous membranes are moist.      Pharynx: Oropharynx is clear. Uvula midline.   Eyes:      Extraocular Movements: Extraocular movements intact.      Pupils: Pupils are equal, round, and reactive to light.   Neurological:      Mental Status: He is alert.     A/P  Due to slight asymmetry in his right parotid gland, an US is requested.   His questions were answered. F/up as scheduled. In the meantime, good hydration, parotid massages and warm pad application.        Again, thank you for allowing me to participate in the care of your patient.        Sincerely,        Kb Franz MD

## 2023-12-20 ENCOUNTER — ANCILLARY PROCEDURE (OUTPATIENT)
Dept: ULTRASOUND IMAGING | Facility: CLINIC | Age: 53
End: 2023-12-20
Attending: OTOLARYNGOLOGY
Payer: COMMERCIAL

## 2023-12-20 DIAGNOSIS — D11.0 PLEOMORPHIC ADENOMA OF PAROTID GLAND: ICD-10-CM

## 2023-12-20 PROCEDURE — 76536 US EXAM OF HEAD AND NECK: CPT | Performed by: RADIOLOGY

## 2024-06-02 ENCOUNTER — HEALTH MAINTENANCE LETTER (OUTPATIENT)
Age: 54
End: 2024-06-02

## 2025-06-15 ENCOUNTER — HEALTH MAINTENANCE LETTER (OUTPATIENT)
Age: 55
End: 2025-06-15

## (undated) DEVICE — CLIP HORIZON MED BLUE 002200

## (undated) DEVICE — SU SILK 0 TIE 6X30" A306H

## (undated) DEVICE — SUCTION SLEEVE NEPTUNE 2 125MM 0703-005-125

## (undated) DEVICE — ESU ELEC BLADE 2.75" COATED/INSULATED E1455

## (undated) DEVICE — SUCTION MANIFOLD NEPTUNE 2 SYS 1 PORT 702-025-000

## (undated) DEVICE — GLOVE PROTEXIS BLUE W/NEU-THERA 6.5  2D73EB65

## (undated) DEVICE — EYE PREP BETADINE 5% SOLUTION 30ML 0065-0411-30

## (undated) DEVICE — SU VICRYL 3-0 SH 8X18" UND J864D

## (undated) DEVICE — ESU GROUND PAD ADULT W/CORD E7507

## (undated) DEVICE — SU VICRYL 4-0 RB-1 27" UND J214H

## (undated) DEVICE — SU PROLENE 5-0 RB-1DA 36"  8556H

## (undated) DEVICE — STIMULATOR NERVE NEURO-PULSE SURGICAL LOCATOR 30968-220

## (undated) DEVICE — ESU HOLSTER PLASTIC DISP E2400

## (undated) DEVICE — ESU PENCIL SMOKE EVAC W/ROCKER SWITCH 0703-047-000

## (undated) DEVICE — DRAIN JACKSON PRATT RESERVOIR 100ML SU130-1305

## (undated) DEVICE — Device

## (undated) DEVICE — BLADE KNIFE SURG 12 371112

## (undated) DEVICE — PREP SKIN SCRUB TRAY 4461A

## (undated) DEVICE — SU ETHILON 3-0 PS-1 18" 1663H

## (undated) DEVICE — SPONGE KITTNER 30-101

## (undated) DEVICE — DRAPE POUCH INSTRUMENT 1018

## (undated) DEVICE — SU SILK 2-0 SH CR 5X18" C0125

## (undated) DEVICE — DRAPE WARMER 66X44" ORS-300

## (undated) DEVICE — LINEN TOWEL PACK X30 5481

## (undated) DEVICE — ESU CORD BIPOLAR GREEN 10-4000

## (undated) DEVICE — SU SILK 4-0 TIE 12X30" A303H

## (undated) DEVICE — DRSG TEGADERM 2 3/8X2 3/4" 1624W

## (undated) DEVICE — CLIP HORIZON SM RED WIDE SLOT 001201

## (undated) DEVICE — DRSG GAUZE 4X8"

## (undated) DEVICE — DRSG KERLIX FLUFFS X5

## (undated) DEVICE — DRSG JAWBRA  95

## (undated) DEVICE — PACK ENT MINOR CUSTOM ASC

## (undated) DEVICE — DRAIN JACKSON PRATT 07MM FLAT SU130-1310

## (undated) DEVICE — NIM ELEC SUBDERMAL NDL 3PAIR/BOX

## (undated) DEVICE — DRAPE IOBAN INCISE 13X13" 6640EZ

## (undated) DEVICE — LABEL MEDICATION SYSTEM 3303-P

## (undated) DEVICE — RETR ELASTIC STAYS LONE STAR BLUNT DUAL LEAD 3550-1G

## (undated) DEVICE — NIM PROBE PRASS INCREMENTING TIP 8225825

## (undated) DEVICE — SU SILK 3-0 TIE 12X30" A304H

## (undated) DEVICE — GLOVE PROTEXIS MICRO 6.0  2D73PM60

## (undated) DEVICE — SU SILK 2-0 TIE 12X30" A305H

## (undated) DEVICE — PREP POVIDONE IODINE SOLUTION 10% 120ML

## (undated) DEVICE — DRSG GAUZE 4X4" TRAY 6939

## (undated) DEVICE — DRSG TELFA 3X8" 1238

## (undated) RX ORDER — DEXAMETHASONE SODIUM PHOSPHATE 4 MG/ML
INJECTION, SOLUTION INTRA-ARTICULAR; INTRALESIONAL; INTRAMUSCULAR; INTRAVENOUS; SOFT TISSUE
Status: DISPENSED
Start: 2017-06-15

## (undated) RX ORDER — KETOROLAC TROMETHAMINE 30 MG/ML
INJECTION, SOLUTION INTRAMUSCULAR; INTRAVENOUS
Status: DISPENSED
Start: 2017-06-15

## (undated) RX ORDER — FENTANYL CITRATE 50 UG/ML
INJECTION, SOLUTION INTRAMUSCULAR; INTRAVENOUS
Status: DISPENSED
Start: 2017-06-15

## (undated) RX ORDER — PROPOFOL 10 MG/ML
INJECTION, EMULSION INTRAVENOUS
Status: DISPENSED
Start: 2017-06-15

## (undated) RX ORDER — GLYCOPYRROLATE 0.2 MG/ML
INJECTION INTRAMUSCULAR; INTRAVENOUS
Status: DISPENSED
Start: 2017-06-15

## (undated) RX ORDER — GABAPENTIN 300 MG/1
CAPSULE ORAL
Status: DISPENSED
Start: 2017-06-15

## (undated) RX ORDER — ONDANSETRON 2 MG/ML
INJECTION INTRAMUSCULAR; INTRAVENOUS
Status: DISPENSED
Start: 2017-06-15

## (undated) RX ORDER — EPHEDRINE SULFATE 50 MG/ML
INJECTION, SOLUTION INTRAMUSCULAR; INTRAVENOUS; SUBCUTANEOUS
Status: DISPENSED
Start: 2017-06-15

## (undated) RX ORDER — ACETAMINOPHEN 325 MG/1
TABLET ORAL
Status: DISPENSED
Start: 2017-06-15